# Patient Record
Sex: FEMALE | Race: WHITE | NOT HISPANIC OR LATINO
[De-identification: names, ages, dates, MRNs, and addresses within clinical notes are randomized per-mention and may not be internally consistent; named-entity substitution may affect disease eponyms.]

---

## 2017-04-06 PROBLEM — Z00.00 ENCOUNTER FOR PREVENTIVE HEALTH EXAMINATION: Status: ACTIVE | Noted: 2017-04-06

## 2017-05-04 ENCOUNTER — APPOINTMENT (OUTPATIENT)
Dept: PULMONOLOGY | Facility: CLINIC | Age: 62
End: 2017-05-04

## 2017-05-04 VITALS
TEMPERATURE: 99.3 F | WEIGHT: 185 LBS | SYSTOLIC BLOOD PRESSURE: 130 MMHG | HEIGHT: 65 IN | BODY MASS INDEX: 30.82 KG/M2 | DIASTOLIC BLOOD PRESSURE: 70 MMHG | HEART RATE: 84 BPM | OXYGEN SATURATION: 98 %

## 2017-05-04 DIAGNOSIS — E11.9 TYPE 2 DIABETES MELLITUS W/OUT COMPLICATIONS: ICD-10-CM

## 2017-05-04 DIAGNOSIS — R91.8 OTHER NONSPECIFIC ABNORMAL FINDING OF LUNG FIELD: ICD-10-CM

## 2017-05-04 DIAGNOSIS — K50.10 CROHN'S DISEASE OF LARGE INTESTINE W/OUT COMPLICATIONS: ICD-10-CM

## 2017-05-04 DIAGNOSIS — J45.909 UNSPECIFIED ASTHMA, UNCOMPLICATED: ICD-10-CM

## 2017-05-04 RX ORDER — CANAGLIFLOZIN 300 MG/1
TABLET, FILM COATED ORAL
Refills: 0 | Status: ACTIVE | COMMUNITY

## 2017-05-04 RX ORDER — METOPROLOL TARTRATE 50 MG/1
50 TABLET, FILM COATED ORAL
Refills: 0 | Status: ACTIVE | COMMUNITY

## 2017-05-04 RX ORDER — BUDESONIDE 3 MG/1
3 CAPSULE, COATED PELLETS ORAL
Refills: 0 | Status: ACTIVE | COMMUNITY

## 2017-05-05 PROBLEM — R91.8 MULTIPLE PULMONARY NODULES: Status: ACTIVE | Noted: 2017-05-05

## 2017-06-08 ENCOUNTER — APPOINTMENT (OUTPATIENT)
Dept: PULMONOLOGY | Facility: CLINIC | Age: 62
End: 2017-06-08

## 2017-06-08 VITALS
TEMPERATURE: 98.1 F | HEART RATE: 89 BPM | OXYGEN SATURATION: 98 % | SYSTOLIC BLOOD PRESSURE: 124 MMHG | WEIGHT: 185 LBS | BODY MASS INDEX: 30.82 KG/M2 | HEIGHT: 65 IN | DIASTOLIC BLOOD PRESSURE: 70 MMHG

## 2017-06-08 RX ORDER — PREDNISONE 10 MG/1
10 TABLET ORAL
Qty: 30 | Refills: 1 | Status: ACTIVE | COMMUNITY
Start: 2017-06-08 | End: 1900-01-01

## 2017-06-19 ENCOUNTER — APPOINTMENT (OUTPATIENT)
Dept: PULMONOLOGY | Facility: CLINIC | Age: 62
End: 2017-06-19

## 2017-06-19 VITALS
HEIGHT: 65 IN | TEMPERATURE: 97.9 F | BODY MASS INDEX: 30.82 KG/M2 | SYSTOLIC BLOOD PRESSURE: 132 MMHG | DIASTOLIC BLOOD PRESSURE: 72 MMHG | HEART RATE: 77 BPM | OXYGEN SATURATION: 98 % | WEIGHT: 185 LBS

## 2017-07-20 ENCOUNTER — APPOINTMENT (OUTPATIENT)
Dept: PULMONOLOGY | Facility: CLINIC | Age: 62
End: 2017-07-20

## 2017-08-14 ENCOUNTER — APPOINTMENT (OUTPATIENT)
Dept: PULMONOLOGY | Facility: CLINIC | Age: 62
End: 2017-08-14
Payer: COMMERCIAL

## 2017-08-14 VITALS
HEART RATE: 85 BPM | OXYGEN SATURATION: 96 % | HEIGHT: 65 IN | BODY MASS INDEX: 30.82 KG/M2 | WEIGHT: 185 LBS | DIASTOLIC BLOOD PRESSURE: 72 MMHG | TEMPERATURE: 98.7 F | SYSTOLIC BLOOD PRESSURE: 124 MMHG

## 2017-08-14 PROCEDURE — 99214 OFFICE O/P EST MOD 30 MIN: CPT | Mod: 25

## 2017-08-14 PROCEDURE — 94010 BREATHING CAPACITY TEST: CPT

## 2017-08-15 ENCOUNTER — LABORATORY RESULT (OUTPATIENT)
Age: 62
End: 2017-08-15

## 2017-08-28 LAB
A ALTERNATA IGE QN: <0.1 KUA/L
A FUMIGATUS IGE QN: <0.1 KUA/L
C ALBICANS IGE QN: <0.1 KUA/L
C HERBARUM IGE QN: <0.1 KUA/L
CAT DANDER IGE QN: 0.67 KUA/L
COMMON RAGWEED IGE QN: <0.1 KUA/L
D FARINAE IGE QN: <0.1 KUA/L
D PTERONYSS IGE QN: <0.1 KUA/L
DEPRECATED A ALTERNATA IGE RAST QL: 0
DEPRECATED A FUMIGATUS IGE RAST QL: 0
DEPRECATED C ALBICANS IGE RAST QL: 0
DEPRECATED C HERBARUM IGE RAST QL: 0
DEPRECATED CAT DANDER IGE RAST QL: ABNORMAL
DEPRECATED COMMON RAGWEED IGE RAST QL: 0
DEPRECATED D FARINAE IGE RAST QL: 0
DEPRECATED D PTERONYSS IGE RAST QL: 0
DEPRECATED DOG DANDER IGE RAST QL: ABNORMAL
DEPRECATED M RACEMOSUS IGE RAST QL: 0
DEPRECATED ROACH IGE RAST QL: 0
DEPRECATED TIMOTHY IGE RAST QL: 0
DEPRECATED WHITE OAK IGE RAST QL: ABNORMAL
DOG DANDER IGE QN: 0.65 KUA/L
EOSINOPHIL # BLD MANUAL: 500 /UL
M RACEMOSUS IGE QN: <0.1 KUA/L
ROACH IGE QN: <0.1 KUA/L
TIMOTHY IGE QN: <0.1 KUA/L
TOTAL IGE SMQN RAST: 31 KU/L
WHITE OAK IGE QN: 0.49 KUA/L

## 2017-09-06 RX ORDER — RESLIZUMAB 10 MG/ML
250 INJECTION, SOLUTION, CONCENTRATE INTRAVENOUS ONCE
Qty: 0 | Refills: 0 | Status: DISCONTINUED | OUTPATIENT
Start: 2017-09-07 | End: 2017-09-22

## 2017-09-07 ENCOUNTER — OUTPATIENT (OUTPATIENT)
Dept: OUTPATIENT SERVICES | Facility: HOSPITAL | Age: 62
LOS: 1 days | End: 2017-09-07
Payer: COMMERCIAL

## 2017-09-07 DIAGNOSIS — J45.909 UNSPECIFIED ASTHMA, UNCOMPLICATED: ICD-10-CM

## 2017-09-07 PROCEDURE — 96413 CHEMO IV INFUSION 1 HR: CPT

## 2017-10-04 RX ORDER — RESLIZUMAB 10 MG/ML
250 INJECTION, SOLUTION, CONCENTRATE INTRAVENOUS ONCE
Qty: 0 | Refills: 0 | Status: DISCONTINUED | OUTPATIENT
Start: 2017-10-05 | End: 2017-10-20

## 2017-10-05 ENCOUNTER — OUTPATIENT (OUTPATIENT)
Dept: OUTPATIENT SERVICES | Facility: HOSPITAL | Age: 62
LOS: 1 days | End: 2017-10-05
Payer: COMMERCIAL

## 2017-10-05 DIAGNOSIS — J45.909 UNSPECIFIED ASTHMA, UNCOMPLICATED: ICD-10-CM

## 2017-10-05 PROCEDURE — 96413 CHEMO IV INFUSION 1 HR: CPT

## 2017-11-01 RX ORDER — RESLIZUMAB 10 MG/ML
250 INJECTION, SOLUTION, CONCENTRATE INTRAVENOUS ONCE
Qty: 0 | Refills: 0 | Status: DISCONTINUED | OUTPATIENT
Start: 2017-11-02 | End: 2017-11-17

## 2017-11-02 ENCOUNTER — APPOINTMENT (OUTPATIENT)
Dept: INFUSION THERAPY | Facility: HOSPITAL | Age: 62
End: 2017-11-02

## 2017-11-02 ENCOUNTER — OUTPATIENT (OUTPATIENT)
Dept: OUTPATIENT SERVICES | Facility: HOSPITAL | Age: 62
LOS: 1 days | End: 2017-11-02
Payer: COMMERCIAL

## 2017-11-02 DIAGNOSIS — J45.909 UNSPECIFIED ASTHMA, UNCOMPLICATED: ICD-10-CM

## 2017-11-02 PROCEDURE — 96413 CHEMO IV INFUSION 1 HR: CPT

## 2017-11-29 ENCOUNTER — APPOINTMENT (OUTPATIENT)
Dept: PULMONOLOGY | Facility: CLINIC | Age: 62
End: 2017-11-29
Payer: COMMERCIAL

## 2017-11-29 VITALS
DIASTOLIC BLOOD PRESSURE: 80 MMHG | OXYGEN SATURATION: 98 % | TEMPERATURE: 98.1 F | WEIGHT: 185 LBS | HEIGHT: 65 IN | SYSTOLIC BLOOD PRESSURE: 120 MMHG | HEART RATE: 80 BPM | BODY MASS INDEX: 30.82 KG/M2

## 2017-11-29 PROCEDURE — 94010 BREATHING CAPACITY TEST: CPT

## 2017-11-29 PROCEDURE — 99215 OFFICE O/P EST HI 40 MIN: CPT | Mod: 25

## 2017-11-29 RX ORDER — RESLIZUMAB 10 MG/ML
250 INJECTION, SOLUTION, CONCENTRATE INTRAVENOUS ONCE
Qty: 0 | Refills: 0 | Status: DISCONTINUED | OUTPATIENT
Start: 2017-11-30 | End: 2017-12-15

## 2017-11-30 ENCOUNTER — OUTPATIENT (OUTPATIENT)
Dept: OUTPATIENT SERVICES | Facility: HOSPITAL | Age: 62
LOS: 1 days | End: 2017-11-30
Payer: COMMERCIAL

## 2017-11-30 ENCOUNTER — APPOINTMENT (OUTPATIENT)
Dept: INFUSION THERAPY | Facility: HOSPITAL | Age: 62
End: 2017-11-30

## 2017-11-30 DIAGNOSIS — J45.909 UNSPECIFIED ASTHMA, UNCOMPLICATED: ICD-10-CM

## 2017-11-30 PROCEDURE — 96413 CHEMO IV INFUSION 1 HR: CPT

## 2017-12-05 DIAGNOSIS — J45.50 SEVERE PERSISTENT ASTHMA, UNCOMPLICATED: ICD-10-CM

## 2017-12-27 RX ORDER — RESLIZUMAB 10 MG/ML
250 INJECTION, SOLUTION, CONCENTRATE INTRAVENOUS ONCE
Qty: 0 | Refills: 0 | Status: DISCONTINUED | OUTPATIENT
Start: 2018-01-23 | End: 2018-02-07

## 2018-01-04 ENCOUNTER — APPOINTMENT (OUTPATIENT)
Dept: INFUSION THERAPY | Facility: HOSPITAL | Age: 63
End: 2018-01-04

## 2018-01-23 ENCOUNTER — APPOINTMENT (OUTPATIENT)
Dept: INFUSION THERAPY | Facility: HOSPITAL | Age: 63
End: 2018-01-23

## 2018-01-23 ENCOUNTER — OUTPATIENT (OUTPATIENT)
Dept: OUTPATIENT SERVICES | Facility: HOSPITAL | Age: 63
LOS: 1 days | End: 2018-01-23
Payer: COMMERCIAL

## 2018-01-23 DIAGNOSIS — J45.909 UNSPECIFIED ASTHMA, UNCOMPLICATED: ICD-10-CM

## 2018-01-23 PROCEDURE — 96413 CHEMO IV INFUSION 1 HR: CPT

## 2018-02-26 ENCOUNTER — APPOINTMENT (OUTPATIENT)
Dept: PULMONOLOGY | Facility: CLINIC | Age: 63
End: 2018-02-26

## 2018-03-22 ENCOUNTER — APPOINTMENT (OUTPATIENT)
Dept: INFUSION THERAPY | Facility: HOSPITAL | Age: 63
End: 2018-03-22

## 2018-06-29 ENCOUNTER — APPOINTMENT (OUTPATIENT)
Dept: PULMONOLOGY | Facility: CLINIC | Age: 63
End: 2018-06-29
Payer: COMMERCIAL

## 2018-06-29 VITALS
DIASTOLIC BLOOD PRESSURE: 80 MMHG | BODY MASS INDEX: 30.82 KG/M2 | TEMPERATURE: 98.8 F | SYSTOLIC BLOOD PRESSURE: 148 MMHG | HEART RATE: 104 BPM | WEIGHT: 185 LBS | OXYGEN SATURATION: 93 % | HEIGHT: 65 IN

## 2018-06-29 PROCEDURE — 94010 BREATHING CAPACITY TEST: CPT

## 2018-06-29 PROCEDURE — 99214 OFFICE O/P EST MOD 30 MIN: CPT | Mod: 25

## 2018-08-03 ENCOUNTER — OUTPATIENT (OUTPATIENT)
Dept: OUTPATIENT SERVICES | Facility: HOSPITAL | Age: 63
LOS: 1 days | End: 2018-08-03
Payer: COMMERCIAL

## 2018-08-03 ENCOUNTER — APPOINTMENT (OUTPATIENT)
Dept: INFUSION THERAPY | Facility: HOSPITAL | Age: 63
End: 2018-08-03

## 2018-08-03 VITALS
SYSTOLIC BLOOD PRESSURE: 128 MMHG | TEMPERATURE: 97 F | RESPIRATION RATE: 18 BRPM | DIASTOLIC BLOOD PRESSURE: 72 MMHG | WEIGHT: 190.04 LBS | OXYGEN SATURATION: 98 % | HEIGHT: 66 IN | HEART RATE: 82 BPM

## 2018-08-03 DIAGNOSIS — J45.909 UNSPECIFIED ASTHMA, UNCOMPLICATED: ICD-10-CM

## 2018-08-03 PROCEDURE — 96372 THER/PROPH/DIAG INJ SC/IM: CPT

## 2018-08-03 RX ORDER — RESLIZUMAB 10 MG/ML
250 INJECTION, SOLUTION, CONCENTRATE INTRAVENOUS ONCE
Qty: 0 | Refills: 0 | Status: COMPLETED | OUTPATIENT
Start: 2018-08-03 | End: 2018-08-03

## 2018-08-03 RX ADMIN — RESLIZUMAB 90 MILLIGRAM(S): 10 INJECTION, SOLUTION, CONCENTRATE INTRAVENOUS at 10:09

## 2018-08-31 ENCOUNTER — OUTPATIENT (OUTPATIENT)
Dept: OUTPATIENT SERVICES | Facility: HOSPITAL | Age: 63
LOS: 1 days | End: 2018-08-31
Payer: COMMERCIAL

## 2018-08-31 ENCOUNTER — APPOINTMENT (OUTPATIENT)
Dept: INFUSION THERAPY | Facility: HOSPITAL | Age: 63
End: 2018-08-31

## 2018-08-31 VITALS
SYSTOLIC BLOOD PRESSURE: 124 MMHG | RESPIRATION RATE: 18 BRPM | OXYGEN SATURATION: 99 % | HEIGHT: 66 IN | HEART RATE: 71 BPM | WEIGHT: 190.04 LBS | DIASTOLIC BLOOD PRESSURE: 71 MMHG | TEMPERATURE: 99 F

## 2018-08-31 DIAGNOSIS — J45.50 SEVERE PERSISTENT ASTHMA, UNCOMPLICATED: ICD-10-CM

## 2018-08-31 PROCEDURE — 96413 CHEMO IV INFUSION 1 HR: CPT

## 2018-08-31 RX ORDER — RESLIZUMAB 10 MG/ML
250 INJECTION, SOLUTION, CONCENTRATE INTRAVENOUS ONCE
Qty: 0 | Refills: 0 | Status: COMPLETED | OUTPATIENT
Start: 2018-08-31 | End: 2018-08-31

## 2018-08-31 RX ADMIN — RESLIZUMAB 90 MILLIGRAM(S): 10 INJECTION, SOLUTION, CONCENTRATE INTRAVENOUS at 13:02

## 2018-09-28 ENCOUNTER — APPOINTMENT (OUTPATIENT)
Dept: INFUSION THERAPY | Facility: HOSPITAL | Age: 63
End: 2018-09-28

## 2018-09-28 ENCOUNTER — OUTPATIENT (OUTPATIENT)
Dept: OUTPATIENT SERVICES | Facility: HOSPITAL | Age: 63
LOS: 1 days | End: 2018-09-28
Payer: COMMERCIAL

## 2018-09-28 VITALS
RESPIRATION RATE: 18 BRPM | SYSTOLIC BLOOD PRESSURE: 119 MMHG | DIASTOLIC BLOOD PRESSURE: 65 MMHG | OXYGEN SATURATION: 97 % | HEART RATE: 76 BPM | TEMPERATURE: 99 F

## 2018-09-28 VITALS
HEART RATE: 66 BPM | DIASTOLIC BLOOD PRESSURE: 66 MMHG | TEMPERATURE: 98 F | SYSTOLIC BLOOD PRESSURE: 121 MMHG | RESPIRATION RATE: 18 BRPM | OXYGEN SATURATION: 98 %

## 2018-09-28 DIAGNOSIS — J45.50 SEVERE PERSISTENT ASTHMA, UNCOMPLICATED: ICD-10-CM

## 2018-09-28 PROCEDURE — 96413 CHEMO IV INFUSION 1 HR: CPT

## 2018-09-28 RX ORDER — RESLIZUMAB 10 MG/ML
252 INJECTION, SOLUTION, CONCENTRATE INTRAVENOUS ONCE
Qty: 0 | Refills: 0 | Status: COMPLETED | OUTPATIENT
Start: 2018-09-28 | End: 2018-09-28

## 2018-09-28 RX ADMIN — RESLIZUMAB 90.24 MILLIGRAM(S): 10 INJECTION, SOLUTION, CONCENTRATE INTRAVENOUS at 10:29

## 2018-11-02 ENCOUNTER — APPOINTMENT (OUTPATIENT)
Dept: INFUSION THERAPY | Facility: HOSPITAL | Age: 63
End: 2018-11-02

## 2018-11-02 ENCOUNTER — OUTPATIENT (OUTPATIENT)
Dept: OUTPATIENT SERVICES | Facility: HOSPITAL | Age: 63
LOS: 1 days | End: 2018-11-02
Payer: COMMERCIAL

## 2018-11-02 VITALS
TEMPERATURE: 98 F | SYSTOLIC BLOOD PRESSURE: 114 MMHG | RESPIRATION RATE: 18 BRPM | DIASTOLIC BLOOD PRESSURE: 72 MMHG | WEIGHT: 184.97 LBS | OXYGEN SATURATION: 98 % | HEART RATE: 82 BPM | HEIGHT: 66 IN

## 2018-11-02 DIAGNOSIS — J45.50 SEVERE PERSISTENT ASTHMA, UNCOMPLICATED: ICD-10-CM

## 2018-11-02 PROCEDURE — 96413 CHEMO IV INFUSION 1 HR: CPT

## 2018-11-02 RX ORDER — RESLIZUMAB 10 MG/ML
252 INJECTION, SOLUTION, CONCENTRATE INTRAVENOUS ONCE
Qty: 0 | Refills: 0 | Status: COMPLETED | OUTPATIENT
Start: 2018-11-02 | End: 2018-11-02

## 2018-11-02 RX ADMIN — RESLIZUMAB 90.24 MILLIGRAM(S): 10 INJECTION, SOLUTION, CONCENTRATE INTRAVENOUS at 12:42

## 2018-11-30 ENCOUNTER — APPOINTMENT (OUTPATIENT)
Dept: INFUSION THERAPY | Facility: HOSPITAL | Age: 63
End: 2018-11-30

## 2019-03-07 ENCOUNTER — OUTPATIENT (OUTPATIENT)
Dept: OUTPATIENT SERVICES | Facility: HOSPITAL | Age: 64
LOS: 1 days | End: 2019-03-07
Payer: COMMERCIAL

## 2019-03-07 ENCOUNTER — APPOINTMENT (OUTPATIENT)
Dept: INFUSION THERAPY | Facility: HOSPITAL | Age: 64
End: 2019-03-07

## 2019-03-07 VITALS
HEIGHT: 66 IN | WEIGHT: 179.9 LBS | RESPIRATION RATE: 18 BRPM | TEMPERATURE: 99 F | DIASTOLIC BLOOD PRESSURE: 68 MMHG | HEART RATE: 76 BPM | SYSTOLIC BLOOD PRESSURE: 118 MMHG | OXYGEN SATURATION: 99 %

## 2019-03-07 DIAGNOSIS — J45.50 SEVERE PERSISTENT ASTHMA, UNCOMPLICATED: ICD-10-CM

## 2019-03-07 PROCEDURE — 96413 CHEMO IV INFUSION 1 HR: CPT

## 2019-03-07 RX ORDER — RESLIZUMAB 10 MG/ML
252 INJECTION, SOLUTION, CONCENTRATE INTRAVENOUS ONCE
Qty: 0 | Refills: 0 | Status: COMPLETED | OUTPATIENT
Start: 2019-03-07 | End: 2019-03-07

## 2019-03-07 RX ADMIN — RESLIZUMAB 90.24 MILLIGRAM(S): 10 INJECTION, SOLUTION, CONCENTRATE INTRAVENOUS at 11:03

## 2019-03-07 RX ADMIN — RESLIZUMAB 252 MILLIGRAM(S): 10 INJECTION, SOLUTION, CONCENTRATE INTRAVENOUS at 12:00

## 2019-04-04 ENCOUNTER — OUTPATIENT (OUTPATIENT)
Dept: OUTPATIENT SERVICES | Facility: HOSPITAL | Age: 64
LOS: 1 days | End: 2019-04-04
Payer: COMMERCIAL

## 2019-04-04 ENCOUNTER — APPOINTMENT (OUTPATIENT)
Dept: INFUSION THERAPY | Facility: HOSPITAL | Age: 64
End: 2019-04-04

## 2019-04-04 VITALS
RESPIRATION RATE: 16 BRPM | DIASTOLIC BLOOD PRESSURE: 65 MMHG | TEMPERATURE: 98 F | HEIGHT: 65 IN | HEART RATE: 82 BPM | WEIGHT: 184.97 LBS | OXYGEN SATURATION: 98 % | SYSTOLIC BLOOD PRESSURE: 115 MMHG

## 2019-04-04 DIAGNOSIS — J45.50 SEVERE PERSISTENT ASTHMA, UNCOMPLICATED: ICD-10-CM

## 2019-04-04 PROCEDURE — 96413 CHEMO IV INFUSION 1 HR: CPT

## 2019-04-04 RX ORDER — RESLIZUMAB 10 MG/ML
252 INJECTION, SOLUTION, CONCENTRATE INTRAVENOUS ONCE
Qty: 0 | Refills: 0 | Status: COMPLETED | OUTPATIENT
Start: 2019-04-04 | End: 2019-04-04

## 2019-04-04 RX ADMIN — RESLIZUMAB 252 MILLIGRAM(S): 10 INJECTION, SOLUTION, CONCENTRATE INTRAVENOUS at 11:45

## 2019-04-04 RX ADMIN — RESLIZUMAB 90.24 MILLIGRAM(S): 10 INJECTION, SOLUTION, CONCENTRATE INTRAVENOUS at 11:12

## 2019-05-03 ENCOUNTER — OUTPATIENT (OUTPATIENT)
Dept: OUTPATIENT SERVICES | Facility: HOSPITAL | Age: 64
LOS: 1 days | End: 2019-05-03
Payer: COMMERCIAL

## 2019-05-03 ENCOUNTER — APPOINTMENT (OUTPATIENT)
Dept: INFUSION THERAPY | Facility: HOSPITAL | Age: 64
End: 2019-05-03

## 2019-05-03 VITALS
HEIGHT: 66 IN | DIASTOLIC BLOOD PRESSURE: 68 MMHG | WEIGHT: 184.97 LBS | HEART RATE: 75 BPM | TEMPERATURE: 98 F | RESPIRATION RATE: 16 BRPM | SYSTOLIC BLOOD PRESSURE: 116 MMHG | OXYGEN SATURATION: 99 %

## 2019-05-03 DIAGNOSIS — J45.50 SEVERE PERSISTENT ASTHMA, UNCOMPLICATED: ICD-10-CM

## 2019-05-03 PROCEDURE — 96413 CHEMO IV INFUSION 1 HR: CPT

## 2019-05-03 RX ORDER — RESLIZUMAB 10 MG/ML
252 INJECTION, SOLUTION, CONCENTRATE INTRAVENOUS ONCE
Qty: 0 | Refills: 0 | Status: COMPLETED | OUTPATIENT
Start: 2019-05-03 | End: 2019-05-03

## 2019-05-03 RX ADMIN — RESLIZUMAB 90.24 MILLIGRAM(S): 10 INJECTION, SOLUTION, CONCENTRATE INTRAVENOUS at 13:34

## 2019-05-03 RX ADMIN — RESLIZUMAB 252 MILLIGRAM(S): 10 INJECTION, SOLUTION, CONCENTRATE INTRAVENOUS at 14:24

## 2019-05-14 ENCOUNTER — APPOINTMENT (OUTPATIENT)
Dept: PULMONOLOGY | Facility: CLINIC | Age: 64
End: 2019-05-14
Payer: COMMERCIAL

## 2019-05-14 VITALS
BODY MASS INDEX: 31.65 KG/M2 | SYSTOLIC BLOOD PRESSURE: 120 MMHG | HEIGHT: 65 IN | WEIGHT: 190 LBS | HEART RATE: 86 BPM | OXYGEN SATURATION: 98 % | TEMPERATURE: 98.5 F | DIASTOLIC BLOOD PRESSURE: 80 MMHG

## 2019-05-14 DIAGNOSIS — Z79.899 OTHER LONG TERM (CURRENT) DRUG THERAPY: ICD-10-CM

## 2019-05-14 PROCEDURE — 99214 OFFICE O/P EST MOD 30 MIN: CPT | Mod: 25

## 2019-05-14 PROCEDURE — 95012 NITRIC OXIDE EXP GAS DETER: CPT

## 2019-05-14 PROCEDURE — 94010 BREATHING CAPACITY TEST: CPT

## 2019-05-14 RX ORDER — BUDESONIDE AND FORMOTEROL FUMARATE DIHYDRATE 160; 4.5 UG/1; UG/1
160-4.5 AEROSOL RESPIRATORY (INHALATION)
Qty: 1 | Refills: 11 | Status: ACTIVE | COMMUNITY
Start: 2019-05-14 | End: 1900-01-01

## 2019-05-14 RX ORDER — MESALAMINE 500 MG
500 SUPPOSITORY, RECTAL RECTAL
Refills: 0 | Status: ACTIVE | COMMUNITY

## 2019-05-14 NOTE — PHYSICAL EXAM
[General Appearance - Well Developed] : well developed [Normal Appearance] : normal appearance [Well Groomed] : well groomed [General Appearance - Well Nourished] : well nourished [No Deformities] : no deformities [General Appearance - In No Acute Distress] : no acute distress [Normal Conjunctiva] : the conjunctiva exhibited no abnormalities [Eyelids - No Xanthelasma] : the eyelids demonstrated no xanthelasmas [Normal Oropharynx] : normal oropharynx [Neck Cervical Mass (___cm)] : no neck mass was observed [Neck Appearance] : the appearance of the neck was normal [Jugular Venous Distention Increased] : there was no jugular-venous distention [Thyroid Diffuse Enlargement] : the thyroid was not enlarged [Thyroid Nodule] : there were no palpable thyroid nodules [Respiration, Rhythm And Depth] : normal respiratory rhythm and effort [FreeTextEntry1] : clear  lungs despite significnat flow obstruction [Exaggerated Use Of Accessory Muscles For Inspiration] : no accessory muscle use [Auscultation Breath Sounds / Voice Sounds] : lungs were clear to auscultation bilaterally [Abnormal Walk] : normal gait [Gait - Sufficient For Exercise Testing] : the gait was sufficient for exercise testing [Nail Clubbing] : no clubbing of the fingernails [Cyanosis, Localized] : no localized cyanosis [Petechial Hemorrhages (___cm)] : no petechial hemorrhages [] : no ischemic changes [Deep Tendon Reflexes (DTR)] : deep tendon reflexes were 2+ and symmetric [Sensation] : the sensory exam was normal to light touch and pinprick [No Focal Deficits] : no focal deficits

## 2019-05-14 NOTE — PROCEDURE
[FreeTextEntry1] : spirometry with siginifiant obstruction with fev1 of 1.2 liters, around 100cc below

## 2019-05-14 NOTE — HISTORY OF PRESENT ILLNESS
[FreeTextEntry1] : back on cinqair after having gone off because ot foot surgery, and now feeling better, not using dual contoller because we miscomuniated, and does feel some cogestion in her chest\par   also on humira for crohns  and on meds for diabetes.\par \par but looks well and feels relatively well.

## 2019-05-14 NOTE — REASON FOR VISIT
[Follow-Up] : a follow-up visit [FreeTextEntry1] : young woman with severe asthma, had delay with cinquair, because of foot surgery, now back on it, but not using dual controller.

## 2019-05-14 NOTE — REVIEW OF SYSTEMS
[Eye Irritation] : ~T irritation of the eyes [Nasal Congestion] : nasal congestion [Dyspnea] : dyspnea [Hay Fever] : hay fever [Negative] : Gastrointestinal [FreeTextEntry8] : spiroemtry is back to it's baseline.

## 2019-05-14 NOTE — DISCUSSION/SUMMARY
[FreeTextEntry1] : plan is to put her back on symicort and have her return in a month\par \par continue with the cinqair for which has been great for her.\par \par multiple other maladies, such as crohns and diabetes discussedin detail

## 2019-05-31 ENCOUNTER — APPOINTMENT (OUTPATIENT)
Dept: INFUSION THERAPY | Facility: HOSPITAL | Age: 64
End: 2019-05-31

## 2019-05-31 ENCOUNTER — OUTPATIENT (OUTPATIENT)
Dept: OUTPATIENT SERVICES | Facility: HOSPITAL | Age: 64
LOS: 1 days | End: 2019-05-31
Payer: COMMERCIAL

## 2019-05-31 VITALS
SYSTOLIC BLOOD PRESSURE: 110 MMHG | DIASTOLIC BLOOD PRESSURE: 70 MMHG | RESPIRATION RATE: 18 BRPM | HEART RATE: 89 BPM | TEMPERATURE: 99 F | OXYGEN SATURATION: 97 %

## 2019-05-31 DIAGNOSIS — J45.50 SEVERE PERSISTENT ASTHMA, UNCOMPLICATED: ICD-10-CM

## 2019-05-31 PROCEDURE — 96413 CHEMO IV INFUSION 1 HR: CPT

## 2019-05-31 RX ORDER — RESLIZUMAB 10 MG/ML
252 INJECTION, SOLUTION, CONCENTRATE INTRAVENOUS ONCE
Refills: 0 | Status: COMPLETED | OUTPATIENT
Start: 2019-05-31 | End: 2019-05-31

## 2019-05-31 RX ADMIN — RESLIZUMAB 252 MILLIGRAM(S): 10 INJECTION, SOLUTION, CONCENTRATE INTRAVENOUS at 11:50

## 2019-05-31 RX ADMIN — RESLIZUMAB 90.24 MILLIGRAM(S): 10 INJECTION, SOLUTION, CONCENTRATE INTRAVENOUS at 11:00

## 2019-06-28 ENCOUNTER — APPOINTMENT (OUTPATIENT)
Dept: INFUSION THERAPY | Facility: HOSPITAL | Age: 64
End: 2019-06-28

## 2019-07-01 ENCOUNTER — APPOINTMENT (OUTPATIENT)
Dept: PULMONOLOGY | Facility: CLINIC | Age: 64
End: 2019-07-01

## 2019-07-26 ENCOUNTER — APPOINTMENT (OUTPATIENT)
Dept: INFUSION THERAPY | Facility: HOSPITAL | Age: 64
End: 2019-07-26

## 2019-07-26 ENCOUNTER — OUTPATIENT (OUTPATIENT)
Dept: OUTPATIENT SERVICES | Facility: HOSPITAL | Age: 64
LOS: 1 days | End: 2019-07-26
Payer: COMMERCIAL

## 2019-07-26 VITALS
TEMPERATURE: 98 F | SYSTOLIC BLOOD PRESSURE: 113 MMHG | DIASTOLIC BLOOD PRESSURE: 72 MMHG | OXYGEN SATURATION: 98 % | HEART RATE: 83 BPM | RESPIRATION RATE: 16 BRPM

## 2019-07-26 DIAGNOSIS — J45.50 SEVERE PERSISTENT ASTHMA, UNCOMPLICATED: ICD-10-CM

## 2019-07-26 PROCEDURE — 96413 CHEMO IV INFUSION 1 HR: CPT

## 2019-07-26 RX ORDER — RESLIZUMAB 10 MG/ML
252 INJECTION, SOLUTION, CONCENTRATE INTRAVENOUS ONCE
Refills: 0 | Status: COMPLETED | OUTPATIENT
Start: 2019-07-26 | End: 2019-07-26

## 2019-07-26 RX ADMIN — RESLIZUMAB 252 MILLIGRAM(S): 10 INJECTION, SOLUTION, CONCENTRATE INTRAVENOUS at 11:40

## 2019-07-26 RX ADMIN — RESLIZUMAB 90.24 MILLIGRAM(S): 10 INJECTION, SOLUTION, CONCENTRATE INTRAVENOUS at 10:50

## 2019-08-23 ENCOUNTER — APPOINTMENT (OUTPATIENT)
Age: 64
End: 2019-08-23

## 2019-08-23 ENCOUNTER — OUTPATIENT (OUTPATIENT)
Dept: OUTPATIENT SERVICES | Facility: HOSPITAL | Age: 64
LOS: 1 days | End: 2019-08-23
Payer: COMMERCIAL

## 2019-08-23 VITALS
TEMPERATURE: 99 F | RESPIRATION RATE: 18 BRPM | SYSTOLIC BLOOD PRESSURE: 139 MMHG | WEIGHT: 195.11 LBS | OXYGEN SATURATION: 98 % | HEART RATE: 75 BPM | HEIGHT: 66 IN | DIASTOLIC BLOOD PRESSURE: 77 MMHG

## 2019-08-23 DIAGNOSIS — J45.50 SEVERE PERSISTENT ASTHMA, UNCOMPLICATED: ICD-10-CM

## 2019-08-23 PROCEDURE — 96413 CHEMO IV INFUSION 1 HR: CPT

## 2019-08-23 RX ORDER — RESLIZUMAB 10 MG/ML
252 INJECTION, SOLUTION, CONCENTRATE INTRAVENOUS ONCE
Refills: 0 | Status: COMPLETED | OUTPATIENT
Start: 2019-08-23 | End: 2019-08-23

## 2019-08-23 RX ADMIN — RESLIZUMAB 90.24 MILLIGRAM(S): 10 INJECTION, SOLUTION, CONCENTRATE INTRAVENOUS at 11:29

## 2019-08-23 RX ADMIN — RESLIZUMAB 252 MILLIGRAM(S): 10 INJECTION, SOLUTION, CONCENTRATE INTRAVENOUS at 12:20

## 2019-09-20 ENCOUNTER — APPOINTMENT (OUTPATIENT)
Age: 64
End: 2019-09-20

## 2019-09-20 ENCOUNTER — OUTPATIENT (OUTPATIENT)
Dept: OUTPATIENT SERVICES | Facility: HOSPITAL | Age: 64
LOS: 1 days | End: 2019-09-20
Payer: COMMERCIAL

## 2019-09-20 DIAGNOSIS — J45.50 SEVERE PERSISTENT ASTHMA, UNCOMPLICATED: ICD-10-CM

## 2019-09-20 PROCEDURE — 96413 CHEMO IV INFUSION 1 HR: CPT

## 2019-09-20 RX ORDER — RESLIZUMAB 10 MG/ML
259 INJECTION, SOLUTION, CONCENTRATE INTRAVENOUS ONCE
Refills: 0 | Status: COMPLETED | OUTPATIENT
Start: 2019-09-20 | End: 2019-09-20

## 2019-09-20 RX ADMIN — RESLIZUMAB 259 MILLIGRAM(S): 10 INJECTION, SOLUTION, CONCENTRATE INTRAVENOUS at 12:00

## 2019-09-20 RX ADMIN — RESLIZUMAB 91.08 MILLIGRAM(S): 10 INJECTION, SOLUTION, CONCENTRATE INTRAVENOUS at 11:10

## 2019-10-14 ENCOUNTER — APPOINTMENT (OUTPATIENT)
Dept: PULMONOLOGY | Facility: CLINIC | Age: 64
End: 2019-10-14
Payer: COMMERCIAL

## 2019-10-14 VITALS
SYSTOLIC BLOOD PRESSURE: 110 MMHG | HEART RATE: 90 BPM | BODY MASS INDEX: 31.65 KG/M2 | WEIGHT: 190 LBS | OXYGEN SATURATION: 94 % | HEIGHT: 65 IN | DIASTOLIC BLOOD PRESSURE: 70 MMHG

## 2019-10-14 PROCEDURE — 95012 NITRIC OXIDE EXP GAS DETER: CPT

## 2019-10-14 PROCEDURE — 94010 BREATHING CAPACITY TEST: CPT

## 2019-10-14 PROCEDURE — 99214 OFFICE O/P EST MOD 30 MIN: CPT | Mod: 25

## 2019-10-18 ENCOUNTER — APPOINTMENT (OUTPATIENT)
Age: 64
End: 2019-10-18

## 2019-10-18 ENCOUNTER — OUTPATIENT (OUTPATIENT)
Dept: OUTPATIENT SERVICES | Facility: HOSPITAL | Age: 64
LOS: 1 days | End: 2019-10-18
Payer: COMMERCIAL

## 2019-10-18 VITALS
SYSTOLIC BLOOD PRESSURE: 134 MMHG | TEMPERATURE: 98 F | OXYGEN SATURATION: 99 % | RESPIRATION RATE: 18 BRPM | DIASTOLIC BLOOD PRESSURE: 70 MMHG | HEART RATE: 94 BPM

## 2019-10-18 DIAGNOSIS — J45.50 SEVERE PERSISTENT ASTHMA, UNCOMPLICATED: ICD-10-CM

## 2019-10-18 PROCEDURE — 96413 CHEMO IV INFUSION 1 HR: CPT

## 2019-10-18 RX ORDER — DULAGLUTIDE 4.5 MG/.5ML
0 INJECTION, SOLUTION SUBCUTANEOUS
Qty: 0 | Refills: 0 | DISCHARGE

## 2019-10-18 RX ORDER — RESLIZUMAB 10 MG/ML
259 INJECTION, SOLUTION, CONCENTRATE INTRAVENOUS ONCE
Refills: 0 | Status: COMPLETED | OUTPATIENT
Start: 2019-10-18 | End: 2019-10-18

## 2019-10-18 RX ORDER — BUDESONIDE, MICRONIZED 100 %
0 POWDER (GRAM) MISCELLANEOUS
Qty: 0 | Refills: 0 | DISCHARGE

## 2019-10-18 RX ORDER — METOPROLOL TARTRATE 50 MG
1 TABLET ORAL
Qty: 0 | Refills: 0 | DISCHARGE

## 2019-10-18 RX ORDER — BUDESONIDE AND FORMOTEROL FUMARATE DIHYDRATE 160; 4.5 UG/1; UG/1
2 AEROSOL RESPIRATORY (INHALATION)
Qty: 0 | Refills: 0 | DISCHARGE

## 2019-10-18 RX ADMIN — RESLIZUMAB 259 MILLIGRAM(S): 10 INJECTION, SOLUTION, CONCENTRATE INTRAVENOUS at 12:05

## 2019-10-18 RX ADMIN — RESLIZUMAB 91.08 MILLIGRAM(S): 10 INJECTION, SOLUTION, CONCENTRATE INTRAVENOUS at 11:15

## 2019-11-15 ENCOUNTER — APPOINTMENT (OUTPATIENT)
Age: 64
End: 2019-11-15

## 2020-02-26 ENCOUNTER — FORM ENCOUNTER (OUTPATIENT)
Age: 65
End: 2020-02-26

## 2020-02-27 ENCOUNTER — APPOINTMENT (OUTPATIENT)
Dept: ORTHOPEDIC SURGERY | Facility: CLINIC | Age: 65
End: 2020-02-27
Payer: MEDICAID

## 2020-02-27 ENCOUNTER — OUTPATIENT (OUTPATIENT)
Dept: OUTPATIENT SERVICES | Facility: HOSPITAL | Age: 65
LOS: 1 days | End: 2020-02-27
Payer: COMMERCIAL

## 2020-02-27 VITALS
HEIGHT: 65 IN | OXYGEN SATURATION: 98 % | WEIGHT: 190 LBS | SYSTOLIC BLOOD PRESSURE: 140 MMHG | HEART RATE: 76 BPM | DIASTOLIC BLOOD PRESSURE: 80 MMHG | BODY MASS INDEX: 31.65 KG/M2

## 2020-02-27 DIAGNOSIS — M17.12 UNILATERAL PRIMARY OSTEOARTHRITIS, LEFT KNEE: ICD-10-CM

## 2020-02-27 PROCEDURE — 73564 X-RAY EXAM KNEE 4 OR MORE: CPT | Mod: 26,50

## 2020-02-27 PROCEDURE — 99204 OFFICE O/P NEW MOD 45 MIN: CPT

## 2020-02-27 PROCEDURE — 73564 X-RAY EXAM KNEE 4 OR MORE: CPT

## 2020-02-27 RX ORDER — TRAMADOL HYDROCHLORIDE 50 MG/1
50 TABLET, COATED ORAL
Qty: 28 | Refills: 0 | Status: ACTIVE | COMMUNITY
Start: 2020-02-27 | End: 1900-01-01

## 2020-02-27 RX ORDER — TRAMADOL HYDROCHLORIDE 50 MG/1
50 TABLET, COATED ORAL
Qty: 28 | Refills: 0 | Status: DISCONTINUED | COMMUNITY
Start: 2020-02-27 | End: 2020-02-27

## 2020-02-27 NOTE — PHYSICAL EXAM
[de-identified] : General: Well-nourished, well-developed, alert, and in no acute distress.\par Head: Normocephalic.\par Eyes: Pupils equal round reactive to light and accommodation, extraocular muscles intact, normal sclera.\par Nose: No nasal discharge.\par Cardiac: Regular rate. Extremities are warm and well perfused. Distal pulses are symmetric bilaterally.\par Respiratory: No labored breathing.\par Extremities: Sensation is intact distally bilaterally.  Distal pulses are symmetric bilaterally\par Neurologic: No focal deficits\par Skin: Normal skin color, texture, and turgor\par Psychiatric: Normal affect\par \par RIGHT KNEE: LIMITED 2/2 GUARDING\par \par Inspection: no bruising, erythema\par Joint Effusion:TRACE\par ROM: Knee Flexion 100 DEGREES WITH EXTREME PAIN, Knee Extension +5 DEGREES\par Palpation:SEVERE MEDIAL JOINT LINE PAIN, PAIN AT MFC AND MEDIAL TIBIAL PLATEAU, PATELLAR FACET PAIN, No pain at patellar tendon, LFC, Lateral Tibial Plateau\par Leg Length Discrepancy:no\par Patella: MILDLY APPREHENSIVE\par Distal Pulses: normal\par Lower Extremity Strength:4/5 KNEE EXTENSION 5-/5 KNEE EXTENSION\par Lower Extremity Reflexes:normal, 2+\par Lower Extremity Sensation: normal\par \par Special Tests:\par Mallory:EQUIVIOCAL\par Layla: POSITIVE MEDIALLY\par Anterior Drawer:Negative\par Posterior Drawer:Negative \par Varus/Valgus: PAIN WITH VALGUS STRESS, NO SIGNIFICANT GAPPING\par \par LEFT KNEE:\par \par Inspection: no bruising, swelling, erythema\par Joint Effusion:no \par ROM: Knee Flexion 130 , Knee Extension 0\par Palpation:MEDIAL JOINT LINE PAIN, MILD PAIN AT MEDIAL TIBIAL PLATEAU, No pain at patellar tendon, MFC/LFC, Lateral Tibial Plateau\par Leg Length Discrepancy:no\par Patella: no apprehension\par Distal Pulses: normal\par Lower Extremity Strength:normal, 5/5 \par Lower Extremity Reflexes:normal, 2+\par Lower Extremity Sensation: normal\par \par Special Tests:\par Mallory:Negative \par Layla: Negative\par Anterior Drawer:Negative\par Posterior Drawer:Negative \par Varus/Valgus:Negative, no instability\par  [de-identified] : Xray Bilateral Knees - Multiple views were reviewed with the patient in detail. \par \par INTERPRETATION: Indication: Knee pain \par \par Four views of each knee demonstrate no fracture or dislocation. Bilateral \par patellofemoral and medial compartment joint space narrowing with osteophyte \par formation is present. There is no effusion. No fracture or dislocation is \par present.

## 2020-02-27 NOTE — DISCUSSION/SUMMARY
[Medication Risks Reviewed] : Medication risks reviewed [de-identified] : The patient is a 64 year old woman with history of Crohn's Disease, DM, presenting with a several month history of severe, atraumatic, bilateral knee pain, right worse than left.  She has radiographic evidence of severe knee arthrosis affecting medial and patellofemoral compartment with erosive changes.  Cannot rule out inflammatory arthropathy.\par \par Imaging was reviewed with the patient in detail.  All questions were answered appropriately.\par \par MRI of bilateral knees ordered today to rule out insufficiency fracture, erosive arthropathy, inflammatory synovitis, AVN.\par \par Patient was prescribed a short course of Tramadol.  Prior to prescribing, history of the patient's scheduled medication use was reviewed utilizing the I:STOP [place]  aware program.  Appropriate use of medication was discussed with the patient in detail.  The risks, benefits, adverse effects, alternative options were discussed.  The patient expressed understanding.\par \par I recommended that she have a consultation with Rheumatology to rule out autoimmune/rheumatologic process.\par \par She was counseled on rest and activity modification.  Continue use of walking cane to offload area.\par \par Follow-up after MRI.  If symptoms consistent with OA, inflammatory arthritis, can consider cortisone vs. HA injections.\par \par Patient appreciates and agrees with current plan.\par \par This note was generated using dragon medical dictation software.  A reasonable effort has been made for proofreading its contents, but typos may still remain.  If there are any questions or points of clarification needed please notify my office.\par \par \par

## 2020-02-27 NOTE — REVIEW OF SYSTEMS
[Negative] : Endocrine [Joint Pain] : joint pain [Feeling Tired] : fatigue [Abdominal Pain] : abdominal pain [Joint Swelling] : joint swelling [Depression] : depression [Diarrhea] : diarrhea [Sleep Disturbances] : ~T sleep disturbances [Muscle Weakness] : muscle weakness [Hot Flashes] : hot flashes

## 2020-02-27 NOTE — HISTORY OF PRESENT ILLNESS
[de-identified] : The patient is a 64 year old woman with history of Crohn's disease, DM presenting with bilateral knee pain, right worse than left.\par \par The patient presents with a several month history of atraumatic, bilateral knee pain.  She has a history of Crohn's disease but has not seen her GI specialist for several months because of change in insurance/providers.  She initially saw a physician, Dr. Morris in August and had Xrays of bilateral knees showing medial compartment narrowing and erosive changes at the patellofemoral joint.  Apparently, she was possibly prescribed a gout medication to help with inflammation, but was discontinued by her PMD.  She is not a good candidate for NSAIDs given Crohn's disease.  Her pain has progressively worsened over the last few months.  She has a history of right ankle surgery, and since that surgery, she has been using a walking cane as an assist device.  She denies significant knee swelling or bruising.  She feels unstable on her knees.  She also complains of pain in multiple joints in including her wrists and ankles.  She has never seen a rheumatologist.  She denies constitutional symptoms including fever, chills.  She has nighttime pain and rest pain.\par \par Pain is rated 8/10, described as throbbing/shooting, improved with rest, worse with walking. [8] : a current pain level of 8/10

## 2020-03-04 ENCOUNTER — APPOINTMENT (OUTPATIENT)
Dept: RHEUMATOLOGY | Facility: CLINIC | Age: 65
End: 2020-03-04
Payer: MEDICAID

## 2020-03-04 VITALS
TEMPERATURE: 98.1 F | OXYGEN SATURATION: 95 % | SYSTOLIC BLOOD PRESSURE: 137 MMHG | WEIGHT: 197 LBS | HEART RATE: 89 BPM | BODY MASS INDEX: 31.66 KG/M2 | DIASTOLIC BLOOD PRESSURE: 62 MMHG | HEIGHT: 66 IN

## 2020-03-04 PROCEDURE — 36415 COLL VENOUS BLD VENIPUNCTURE: CPT

## 2020-03-04 PROCEDURE — 99204 OFFICE O/P NEW MOD 45 MIN: CPT | Mod: 25

## 2020-03-04 RX ORDER — SITAGLIPTIN 100 MG/1
100 TABLET, FILM COATED ORAL
Refills: 0 | Status: ACTIVE | COMMUNITY

## 2020-03-04 RX ORDER — ADALIMUMAB 40MG/0.8ML
40 KIT SUBCUTANEOUS
Refills: 0 | Status: ACTIVE | COMMUNITY

## 2020-03-04 NOTE — PROCEDURE
[FreeTextEntry1] : This is a 64-year-old woman she presents for a initial evaluation she has had knee pain bilaterally worse on the right than the left she is also at other joint pains she does have a history of Crohn's disease she had been on a biologic in the past but not currently she is trying to get reapproval of it she also is very active Crohn's disease with increasing GI symptomatology there is no history of psoriasis there is no history of weight loss.I explained to her that I suspect this is a component of inflammatory bowel disease associated arthritis and appropriate treatment for her Crohn's disease but hopefully result in significant improvement of her joint symptoms nonetheless I am doing some additional serologies to exclude other possible causes of her joint issues I am also awaiting review of the MRI she is apparently having it done of both knees in the next few days.I plan to discuss all results with her when they're completed

## 2020-03-04 NOTE — REVIEW OF SYSTEMS
[Feeling Poorly] : feeling poorly [Feeling Tired] : feeling tired [Abdominal Pain] : abdominal pain [Diarrhea] : diarrhea [Arthralgias] : arthralgias [Joint Pain] : joint pain [Joint Swelling] : joint swelling [Joint Stiffness] : joint stiffness [Limb Swelling] : limb swelling [Eye Pain] : no eye pain [Red Eyes] : eyes not red [Dry Eyes] : no dryness of the eyes [Sore Throat] : no sore throat [Hoarseness] : no hoarseness [Chest Pain] : no chest pain [Palpitations] : no palpitations [Lower Ext Edema] : no lower extremity edema [Shortness Of Breath] : no shortness of breath [Wheezing] : no wheezing [Cough] : no cough [SOB on Exertion] : no shortness of breath during exertion [Skin Lesions] : no skin lesions [Itching] : no itching [Dizziness] : no dizziness [Feelings Of Weakness] : no feelings of weakness [Easy Bleeding] : no tendency for easy bleeding [Easy Bruising] : no tendency for easy bruising [de-identified] : no psoriasis

## 2020-03-04 NOTE — HISTORY OF PRESENT ILLNESS
[FreeTextEntry1] : This is a 64-year-old woman she comes for evaluation she has been referred by her orthopedic knee surgeon because of concern for arthritis she reports she's had knee pain in both knees for at least a year she also has pain in other joints including her elbows her knees and most recently her shoulders. She has had left foot issues however has had 3 foot surgeries within the past year or so she does have a history of Crohn's disease she was started on Humira in June of 2019 which apparently helped but she's been off it now for some time due to insurance issues changing of positions she is awaiting preapproval she does report more recent increase Crohn's activity with increasing abdominal pain increasing diarrhea She has no psoriasis she has no skin rashes she is not aware of any family history of any psoriasis Crohn's disease or ulcerative colitis prior to her evaluation by me she was seen by another rheumatologist she was given colchicine she reports on this medication she had increasing diarrhea.She has had x-rays of her knees she is scheduled for an MRI of her knee in the next few days.

## 2020-03-04 NOTE — PHYSICAL EXAM
[General Appearance - Alert] : alert [General Appearance - In No Acute Distress] : in no acute distress [General Appearance - Well Nourished] : well nourished [General Appearance - Well Developed] : well developed [Sclera] : the sclera and conjunctiva were normal [PERRL With Normal Accommodation] : pupils were equal in size, round, and reactive to light [Examination Of The Oral Cavity] : the lips and gums were normal [Oropharynx] : the oropharynx was normal [Neck Appearance] : the appearance of the neck was normal [Neck Cervical Mass (___cm)] : no neck mass was observed [Respiration, Rhythm And Depth] : normal respiratory rhythm and effort [Auscultation Breath Sounds / Voice Sounds] : lungs were clear to auscultation bilaterally [Heart Rate And Rhythm] : heart rate was normal and rhythm regular [Heart Sounds] : normal S1 and S2 [Murmurs] : no murmurs [Edema] : there was no peripheral edema [No CVA Tenderness] : no ~M costovertebral angle tenderness [No Spinal Tenderness] : no spinal tenderness [Abnormal Walk] : normal gait [Musculoskeletal - Swelling] : no joint swelling seen [Motor Tone] : muscle strength and tone were normal [Skin Color & Pigmentation] : normal skin color and pigmentation [] : no rash [Motor Exam] : the motor exam was normal [No Focal Deficits] : no focal deficits [FreeTextEntry1] : right knee - warm / small effusion tender left basal joint

## 2020-03-05 LAB
ALBUMIN SERPL ELPH-MCNC: 4.7 G/DL
ALP BLD-CCNC: 115 U/L
ALT SERPL-CCNC: 22 U/L
ANION GAP SERPL CALC-SCNC: 16 MMOL/L
AST SERPL-CCNC: 16 U/L
BASOPHILS # BLD AUTO: 0.07 K/UL
BASOPHILS NFR BLD AUTO: 0.8 %
BILIRUB SERPL-MCNC: 0.4 MG/DL
BUN SERPL-MCNC: 18 MG/DL
CALCIUM SERPL-MCNC: 10.2 MG/DL
CCP AB SER IA-ACNC: <8 UNITS
CHLORIDE SERPL-SCNC: 104 MMOL/L
CO2 SERPL-SCNC: 22 MMOL/L
CREAT SERPL-MCNC: 0.96 MG/DL
CRP SERPL-MCNC: 0.37 MG/DL
EOSINOPHIL # BLD AUTO: 0.39 K/UL
EOSINOPHIL NFR BLD AUTO: 4.5 %
ERYTHROCYTE [SEDIMENTATION RATE] IN BLOOD BY WESTERGREN METHOD: 56 MM/HR
GLUCOSE SERPL-MCNC: 202 MG/DL
HCT VFR BLD CALC: 43.7 %
HGB BLD-MCNC: 13.5 G/DL
IMM GRANULOCYTES NFR BLD AUTO: 0.3 %
LYMPHOCYTES # BLD AUTO: 2.63 K/UL
LYMPHOCYTES NFR BLD AUTO: 30.3 %
MAN DIFF?: NORMAL
MCHC RBC-ENTMCNC: 28.1 PG
MCHC RBC-ENTMCNC: 30.9 GM/DL
MCV RBC AUTO: 91 FL
MONOCYTES # BLD AUTO: 0.64 K/UL
MONOCYTES NFR BLD AUTO: 7.4 %
NEUTROPHILS # BLD AUTO: 4.91 K/UL
NEUTROPHILS NFR BLD AUTO: 56.7 %
PLATELET # BLD AUTO: 358 K/UL
POTASSIUM SERPL-SCNC: 4.4 MMOL/L
PROT SERPL-MCNC: 7.8 G/DL
RBC # BLD: 4.8 M/UL
RBC # FLD: 14.2 %
RF+CCP IGG SER-IMP: NEGATIVE
RHEUMATOID FACT SER QL: <10 IU/ML
SODIUM SERPL-SCNC: 141 MMOL/L
WBC # FLD AUTO: 8.67 K/UL

## 2020-03-15 RX ORDER — BUDESONIDE AND FORMOTEROL FUMARATE DIHYDRATE 160; 4.5 UG/1; UG/1
160-4.5 AEROSOL RESPIRATORY (INHALATION)
Qty: 1 | Refills: 11 | Status: ACTIVE | OUTPATIENT
Start: 2017-06-19

## 2020-04-06 RX ORDER — BUDESONIDE AND FORMOTEROL FUMARATE DIHYDRATE 160; 4.5 UG/1; UG/1
160-4.5 AEROSOL RESPIRATORY (INHALATION)
Qty: 10.2 | Refills: 7 | Status: ACTIVE | COMMUNITY
Start: 2018-06-29 | End: 1900-01-01

## 2020-04-06 RX ORDER — ALBUTEROL SULFATE 2.5 MG/3ML
(2.5 MG/3ML) SOLUTION RESPIRATORY (INHALATION)
Qty: 1 | Refills: 3 | Status: ACTIVE | COMMUNITY

## 2020-04-17 NOTE — HISTORY OF PRESENT ILLNESS
[FreeTextEntry1] : patient status post foot surgery.  since being on cinquair  ( reslizumab) she has been able to get of prednisone and her asthma, is much better.  no longer in the ER ( was in ER [facility]) and doing superbly in terms of her asthma,  will like to switch her to fasenra, similar effects but cost and time saving.\par \par she is concerned that she is about to lose her insurance!!!

## 2020-04-17 NOTE — PHYSICAL EXAM
[General Appearance - Well Developed] : well developed [Normal Appearance] : normal appearance [Well Groomed] : well groomed [General Appearance - Well Nourished] : well nourished [No Deformities] : no deformities [General Appearance - In No Acute Distress] : no acute distress [Normal Conjunctiva] : the conjunctiva exhibited no abnormalities [Eyelids - No Xanthelasma] : the eyelids demonstrated no xanthelasmas [Normal Oropharynx] : normal oropharynx [Neck Appearance] : the appearance of the neck was normal [Neck Cervical Mass (___cm)] : no neck mass was observed [Jugular Venous Distention Increased] : there was no jugular-venous distention [Thyroid Diffuse Enlargement] : the thyroid was not enlarged [Thyroid Nodule] : there were no palpable thyroid nodules [Respiration, Rhythm And Depth] : normal respiratory rhythm and effort [Exaggerated Use Of Accessory Muscles For Inspiration] : no accessory muscle use [Auscultation Breath Sounds / Voice Sounds] : lungs were clear to auscultation bilaterally [Abnormal Walk] : normal gait [Gait - Sufficient For Exercise Testing] : the gait was sufficient for exercise testing [Nail Clubbing] : no clubbing of the fingernails [Petechial Hemorrhages (___cm)] : no petechial hemorrhages [Cyanosis, Localized] : no localized cyanosis [] : no ischemic changes [Deep Tendon Reflexes (DTR)] : deep tendon reflexes were 2+ and symmetric [Sensation] : the sensory exam was normal to light touch and pinprick [No Focal Deficits] : no focal deficits [FreeTextEntry1] : clear  lungs despite significnat flow obstruction [FreeTextEntry2] : still reconvering from foot surgery

## 2020-04-17 NOTE — DISCUSSION/SUMMARY
[FreeTextEntry1] : her biggest problem is excessive weight,  recovery from surgery,  job instability and doesn't know ho much longer she'll have i nsurance\par \par her asthma however is well controlled on the cinqair and symbicort

## 2020-04-17 NOTE — REVIEW OF SYSTEMS
[Eye Irritation] : ~T irritation of the eyes [Nasal Congestion] : nasal congestion [Dyspnea] : dyspnea [Hay Fever] : hay fever [Negative] : Musculoskeletal [FreeTextEntry8] : spiroemtry is back to it's baseline.

## 2020-04-17 NOTE — REASON FOR VISIT
[Follow-Up] : a follow-up visit [FreeTextEntry1] : severe asthma, back on cinqair after extenisve foot surgery

## 2020-06-11 ENCOUNTER — APPOINTMENT (OUTPATIENT)
Dept: ORTHOPEDIC SURGERY | Facility: CLINIC | Age: 65
End: 2020-06-11
Payer: MEDICAID

## 2020-06-11 ENCOUNTER — RESULT REVIEW (OUTPATIENT)
Age: 65
End: 2020-06-11

## 2020-06-11 ENCOUNTER — OUTPATIENT (OUTPATIENT)
Dept: OUTPATIENT SERVICES | Facility: HOSPITAL | Age: 65
LOS: 1 days | End: 2020-06-11
Payer: COMMERCIAL

## 2020-06-11 VITALS
OXYGEN SATURATION: 98 % | WEIGHT: 197 LBS | TEMPERATURE: 97.6 F | HEART RATE: 104 BPM | BODY MASS INDEX: 31.66 KG/M2 | DIASTOLIC BLOOD PRESSURE: 54 MMHG | HEIGHT: 66 IN | SYSTOLIC BLOOD PRESSURE: 122 MMHG

## 2020-06-11 DIAGNOSIS — M19.90 UNSPECIFIED OSTEOARTHRITIS, UNSPECIFIED SITE: ICD-10-CM

## 2020-06-11 PROCEDURE — 73630 X-RAY EXAM OF FOOT: CPT

## 2020-06-11 PROCEDURE — 73110 X-RAY EXAM OF WRIST: CPT

## 2020-06-11 PROCEDURE — 20611 DRAIN/INJ JOINT/BURSA W/US: CPT | Mod: RT

## 2020-06-11 PROCEDURE — 73110 X-RAY EXAM OF WRIST: CPT | Mod: 26,LT

## 2020-06-11 PROCEDURE — 73600 X-RAY EXAM OF ANKLE: CPT

## 2020-06-11 PROCEDURE — 73630 X-RAY EXAM OF FOOT: CPT | Mod: 26,RT

## 2020-06-11 PROCEDURE — 99213 OFFICE O/P EST LOW 20 MIN: CPT | Mod: 25

## 2020-06-11 PROCEDURE — 73600 X-RAY EXAM OF ANKLE: CPT | Mod: 26,RT

## 2020-06-11 NOTE — HISTORY OF PRESENT ILLNESS
[de-identified] : The patient is a 64 year old woman with history of Crohn's disease, DM presenting for follow-up for bilateral knee pain, right worse than left.\par \par The patient was last seen about 3.5 months ago for a several month history of atraumatic, bilateral knee pain.  Her radiographs were consistent with arthritis, and she was referred to rheumatology to discuss rheumatologic cause for underlying arthritis.  She saw Dr. Bruce, and had bloodwork.  Serology showed mildly elevated ESR, but otherwise unremarkable.  She continues to have similar pain.  Her ROm has mildly improved.  She also had an MRI of the bilateral knees showing:\par \par MRI results for right knee showing complex tear of medial meniscus with extrusion of the medial meniscal body. Moderate medial, mild/moderate lateral, and severe patellofemoral joint osteoarthritis. \par MRI left knee showing complex degenerative tear of the medial meniscus with extrusion of the medial meniscal body. Mild insertional patellar tendinosis. Moderate medial, mild/moderate lateral, and severe patellofemoral joint osteoarthritis.\par \par \par Previous history: She has a history of Crohn's disease but has not seen her GI specialist for several months because of change in insurance/providers.  She initially saw a physician, Dr. Morris in August and had Xrays of bilateral knees showing medial compartment narrowing and erosive changes at the patellofemoral joint.  Apparently, she was possibly prescribed a gout medication to help with inflammation, but was discontinued by her PMD.  She is not a good candidate for NSAIDs given Crohn's disease.  Her pain has progressively worsened over the last few months.  She has a history of right ankle surgery, and since that surgery, she has been using a walking cane as an assist device.  She denies significant knee swelling or bruising.  She feels unstable on her knees.  She also complains of pain in multiple joints in including her wrists and ankles.  She has never seen a rheumatologist.  She denies constitutional symptoms including fever, chills.  She has nighttime pain and rest pain.\par

## 2020-06-11 NOTE — PHYSICAL EXAM
[de-identified] : General: Well-nourished, well-developed, alert, and in no acute distress.\par Head: Normocephalic.\par Eyes: Pupils equal round reactive to light and accommodation, extraocular muscles intact, normal sclera.\par Nose: No nasal discharge.\par Cardiac: Regular rate. Extremities are warm and well perfused. Distal pulses are symmetric bilaterally.\par Respiratory: No labored breathing.\par Extremities: Sensation is intact distally bilaterally.  Distal pulses are symmetric bilaterally\par Neurologic: No focal deficits\par Skin: Normal skin color, texture, and turgor\par Psychiatric: Normal affect\par \par RIGHT KNEE: \par \par Inspection: no bruising, erythema\par Joint Effusion:TRACE\par ROM: Knee Flexion 130 DEGREES WITH MILD PAIN AT END FLEXION, Knee Extension 0 DEGREES\par Palpation:MEDIAL JOINT LINE PAIN, MINIMAL PAIN AT MFC AND MEDIAL TIBIAL PLATEAU, PATELLAR FACET PAIN, No pain at patellar tendon, LFC, Lateral Tibial Plateau\par Leg Length Discrepancy:no\par Patella: MILDLY APPREHENSIVE\par Distal Pulses: normal\par Lower Extremity Strength:5-/5 KNEE EXTENSION 5-/5 KNEE EXTENSION\par Lower Extremity Reflexes:normal, 2+\par Lower Extremity Sensation: normal\par \par Special Tests:\par Mallory:EQUIVIOCAL\par Layla: POSITIVE MEDIALLY\par Anterior Drawer:Negative\par Posterior Drawer:Negative \par Varus/Valgus: PAIN WITH VALGUS STRESS, NO SIGNIFICANT GAPPING\par \par LEFT KNEE:\par \par Inspection: no bruising, swelling, erythema\par Joint Effusion:no \par ROM: Knee Flexion 130 , Knee Extension 0\par Palpation:MEDIAL JOINT LINE PAIN, MILD PAIN AT MEDIAL TIBIAL PLATEAU, No pain at patellar tendon, MFC/LFC, Lateral Tibial Plateau\par Leg Length Discrepancy:no\par Patella: no apprehension\par Distal Pulses: normal\par Lower Extremity Strength:normal, 5/5 \par Lower Extremity Reflexes:normal, 2+\par Lower Extremity Sensation: normal\par \par Special Tests:\par Mallory:Negative \par Layla: Negative\par Anterior Drawer:Negative\par Posterior Drawer:Negative \par Varus/Valgus:Negative, no instability\par  [de-identified] : MRI results for right knee showing complex tear of medial meniscus with extrusion of the medial meniscal body. Moderate medial, mild/moderate lateral, and severe patellofemoral joint osteoarthritis. \par \par MRI left knee showing complex degenerative tear of the medial meniscus with extrusion of the medial meniscal body. Mild insertional patellar tendinosis. Moderate medial, mild/moderate lateral, and severe patellofemoral joint osteoarthritis.

## 2020-06-11 NOTE — DISCUSSION/SUMMARY
[Medication Risks Reviewed] : Medication risks reviewed [de-identified] : The patient is a 64 year old woman with history of Crohn's Disease, DM, presenting with a several month history of severe, atraumatic, bilateral knee pain, right worse than left.  She has evidence of bilateral knee osteoarthritis.\par \par Imaging was reviewed with the patient in detail.  All questions were answered appropriately.\par \par After informed consent, and explanation of risks, benefits, alternatives, adverse effects of injection, which includes but is not limited to infection, bleeding, allergic reaction, swelling, failure to improve symptoms, the patient would like to proceed with the procedure - RIGHT KNEE ULTRASOUND GUIDED CORTICOSTEROID INJECTION . See procedure note above. Patient tolerated the procedure well. The patient was provided with postinjection instructions.\par \par We discussed potential for HA injections.  Patient would like to think about it.\par \par Continue home exercise program.  The patient was counseled on activity modification.\par \par She also complains of right ankle and left thumb pain.  We will order Xrays and patient may return for visit at another time.\par \par Patient appreciates and agrees with current plan.\par \par This note was generated using dragon medical dictation software.  A reasonable effort has been made for proofreading its contents, but typos may still remain.  If there are any questions or points of clarification needed please notify my office.\par \par >15 minutes of time was spent in total for the encounter.  >50% of the time spent was on counseling/coordination of care and medical-decision making for this patient.\par \par

## 2020-06-11 NOTE — PROCEDURE
[de-identified] : Ultrasound-Guided RIGHT Knee Injection\par \par Indication for U/S Guidance: Ensure placement within the tibiofemoral joint for diagnostic purposes, while avoiding neurovascular structures\par \par Indication for Injection: Knee Osteoarthritis\par \par A discussion was had with the patient regarding this procedure and all questions were answered. All risks, benefits and alternatives were discussed. These included but were not limited to bleeding, infection, and allergic reaction. A timeout was done to ensure correct side and pt agreed to the procedure. Betadine was used to sterilize and prep the area, and alcohol was used to clean the skin in the anterior aspect of the knee joint. The suprapatellar space was visualized utilizing the Sonosite, linear transducer. The joint was visualized in the short axis and an in-plane approach was used for the injection. Ultrasound guidance was utilized to ensure accuracy of the intra-articular injection and avoid the neurovascular structures. A 22-gauge 1.5" needle was used to inject 4cc of 1% lidocaine without epi.  This was followed by injection with 1cc of KENALOG.   An image confirming the correct location of the needle placement and infusion of the steroid at the end of the injection was saved. A sterile bandage was then applied. The patient tolerated the procedure well and there were no complications.\par \par \par

## 2020-06-25 ENCOUNTER — APPOINTMENT (OUTPATIENT)
Dept: ORTHOPEDIC SURGERY | Facility: CLINIC | Age: 65
End: 2020-06-25
Payer: MEDICAID

## 2020-06-25 VITALS
SYSTOLIC BLOOD PRESSURE: 140 MMHG | BODY MASS INDEX: 31.66 KG/M2 | WEIGHT: 197 LBS | DIASTOLIC BLOOD PRESSURE: 68 MMHG | HEIGHT: 66 IN | OXYGEN SATURATION: 98 % | HEART RATE: 81 BPM

## 2020-06-25 DIAGNOSIS — M25.532 PAIN IN LEFT WRIST: ICD-10-CM

## 2020-06-25 DIAGNOSIS — M18.12 UNILATERAL PRIMARY OSTEOARTHRITIS OF FIRST CARPOMETACARPAL JOINT, LEFT HAND: ICD-10-CM

## 2020-06-25 PROCEDURE — 99213 OFFICE O/P EST LOW 20 MIN: CPT | Mod: 25

## 2020-06-25 PROCEDURE — 20611 DRAIN/INJ JOINT/BURSA W/US: CPT | Mod: LT

## 2020-06-25 NOTE — HISTORY OF PRESENT ILLNESS
[de-identified] : The patient is a 64 year old, RHD woman history of Crohn's disease, DM, presenting with left thumb pain.\par \par The patient presents with a several month history of acute on chronic, atraumatic, left radial sided wrist pain.  She denies significant stiffness.  She denies elbow or proximal wrist pain.  Pain is worse with thumb motion.  Her pain seems to be constant in nature.  She has been wearing a simple cock-up wrist brace.\par \par Pain is moderate in intensity, described as sharp, improved with rest, worse with thumb motion.

## 2020-06-25 NOTE — PHYSICAL EXAM
[de-identified] : General: Well-nourished, well-developed, alert, and in no acute distress.\par Head: Normocephalic.\par Eyes: Pupils equal round reactive to light and accommodation, extraocular muscles intact, normal sclera.\par Nose: No nasal discharge.\par Cardiac: Regular rate. Extremities are warm and well perfused. Distal pulses are symmetric bilaterally.\par Respiratory: No labored breathing.\par Extremities: Sensation is intact distally bilaterally.  Distal pulses are symmetric bilaterally\par Lymphatic: No regional lymphadenopathy, no lymphedema\par Neurologic: No focal deficits\par Skin: Normal skin color, texture, and turgor\par Psychiatric: Normal affect\par MSK: as noted above/below\par \par RIGHT Hand: \par APPEARANCE: no marked deformities, no swelling or malalignment\par Palpation: no pain at anatomical snuffbox, no pain at a scaphoid tubercle, no pain in CMC, no pain at scapholunate, no pain at lunotriquetrum, no pain at TFCC, no pain at proximal carpal row, no pain at distal carpal row, no pain at metacarpal, no pain at the  MCP, no pain at PIP, no pain at DIP\par ROM: Full ROM at MCP, PIP, DIP\par RESISTIVE TESTIN/5 \par SPECIAL TESTS:  normal flex/ext, abd/add, and opposition of thumb, no triggering of fingers\par PULSES: 2+ radial pulse\par NEURO: AIN, PIN, Ulnar nerve intact to motor\par SENSATION: Intact to light touch throughout\par \par LEFT Hand: \par APPEARANCE: no marked deformities, no swelling or malalignment\par Palpation: PAIN AT BASE OF THUMB AT CMC JOINT, no pain at anatomical snuffbox, no pain at a scaphoid tubercle, no pain at scapholunate, no pain at lunotriquetrum, no pain at TFCC, no pain at proximal carpal row, no pain at distal carpal row, no pain at metacarpal, no pain at the  MCP, no pain at PIP, no pain at DIP\par ROM: Full ROM at MCP, PIP, DIP\par RESISTIVE TESTIN/5 \par SPECIAL TESTS:  normal flex/ext, abd/add, and opposition of thumb, no triggering of fingers\par PULSES: 2+ radial pulse\par NEURO: AIN, PIN, Ulnar nerve intact to motor\par SENSATION: Intact to light touch throughout\par \par THUMB CMC GRIND TEST: POSITIVE\par  [de-identified] : Xray LEFT Wrist - Multiple views were reviewed with the patient in detail. \par \par FINDINGS: No acute fracture or dislocation. There is negative ulnar variance. The carpal alignment is intact. There are severe degenerative changes at the first CMC joint with complete loss of joint space at the radial side, marginal osteophytes, and subchondral cystic change. There is also partial subluxation at the first CMC joint. A lucency is present in the ulnar styloid possibly representing a cystic lesion.\par \par

## 2020-06-25 NOTE — PROCEDURE
[de-identified] : Ultrasound Guided Injection \par Indication: Ensure placement within a small osteoarthritic joint\par \par Utlizing the SonAMX II Edge portable ultrasound machine, the Linear 25, sterilized probe, ultrasound guidance with the probe in the long axis, utilizing an out-of-plane approach, was used for the following injection:\par \par Injection: Cortisone injection to the left 1st CMC joint\par Indication: Osteoarthritis.\par \par A discussion was had with the patient regarding this procedure and all questions were answered. All risks, benefits and alternatives were discussed. These included but were not limited to bleeding, infection, and allergic reaction. A timeout was performed prior to the procedure to ensure proper side and location.  Alcohol was used to clean and sterilize the skin over the lateral CMC joint. A 25-gauge needle was used to inject 0.5cc of 1% lidocaine and 1cc of 6mg/mL betamethasone into the joint. A sterile bandage was then applied. The patient tolerated the procedure well and there were no complications. \par \par

## 2020-06-25 NOTE — DISCUSSION/SUMMARY
[Medication Risks Reviewed] : Medication risks reviewed [de-identified] : The patient is a 64 year old, RHD woman with history of Crohn's Disease, DM, Knee OA presenting with chronic left thumb pain likely secondary to Thumb CMC Arthritis.\par \par Imaging/Diagnostics were interpreted and results were reviewed with the patient in detail.  All questions were answered appropriately.\par \par The patient was counseled on the natural progression of the problem today.  \par \par After informed consent, and explanation of risks, benefits, alternatives, adverse effects of injection, which includes but is not limited to infection, bleeding, allergic reaction, swelling, failure to improve symptoms, the patient would like to proceed with the procedure - LEFT THUMB CMC ULTRASOUND-GUIDED CORTICOSTEROID INJECTION. See procedure note above. Patient tolerated the procedure well. The patient was provided with postinjection instructions.\par \par She was given prescription for thumb spica brace.\par \par  The patient was also provided some general home exercises.  The patient was counseled on activity modification.\par \par Follow-up in 6-8 weeks.  If symptoms persist, we discussed referral to hand/wrist surgery.\par \par ------------------------------------------------------------------------------------------------------------------\par Patient appreciates and agrees with current plan.\par \par This note was generated using a mixture of manual typing and dragon medical dictation software.  A reasonable effort has been made for proofreading its contents, but typos may still remain.  If there are any questions or points of clarification needed please notify my office.\par \par \par >15 minutes of time was spent in total for the encounter.  >50% of the time spent was on counseling/coordination of care and medical-decision making for this patient.\par

## 2020-06-26 ENCOUNTER — RX RENEWAL (OUTPATIENT)
Age: 65
End: 2020-06-26

## 2020-07-13 ENCOUNTER — TRANSCRIPTION ENCOUNTER (OUTPATIENT)
Age: 65
End: 2020-07-13

## 2020-07-14 ENCOUNTER — APPOINTMENT (OUTPATIENT)
Dept: PULMONOLOGY | Facility: CLINIC | Age: 65
End: 2020-07-14
Payer: MEDICAID

## 2020-07-14 VITALS
OXYGEN SATURATION: 96 % | SYSTOLIC BLOOD PRESSURE: 130 MMHG | HEART RATE: 76 BPM | HEIGHT: 66 IN | DIASTOLIC BLOOD PRESSURE: 72 MMHG | TEMPERATURE: 98 F

## 2020-07-14 PROCEDURE — 94010 BREATHING CAPACITY TEST: CPT

## 2020-07-14 PROCEDURE — 99214 OFFICE O/P EST MOD 30 MIN: CPT | Mod: 25

## 2020-07-15 NOTE — PHYSICAL EXAM
[No Acute Distress] : no acute distress [Normal Oropharynx] : normal oropharynx [No Neck Mass] : no neck mass [Normal Appearance] : normal appearance [Normal S1, S2] : normal s1, s2 [No Murmurs] : no murmurs [Normal Rate/Rhythm] : normal rate/rhythm [No Abnormalities] : no abnormalities [No Clubbing] : no clubbing [Normal Gait] : normal gait [Normal Color/ Pigmentation] : normal color/ pigmentation [No Edema] : no edema [No Focal Deficits] : no focal deficits [Oriented x3] : oriented x3 [Normal Affect] : normal affect

## 2020-07-15 NOTE — REVIEW OF SYSTEMS
[Cough] : cough [Fatigue] : fatigue [Seasonal Allergies] : seasonal allergies [Hay Fever] : hay fever [Dyspnea] : dyspnea [Negative] : Endocrine

## 2020-07-15 NOTE — HISTORY OF PRESENT ILLNESS
[TextBox_4] : on fasenra second dose feels that it's beginning to kick in.  she is a remodeled asthmatic who felt better when on cinqair then she lost her job, injured her foot, lost her insurance and stopped te cinqir, now is on fasenra, but has not regained her sense of well being she had when in cinquair.  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 she also had elevated niox also appears to seem depressed.    in the past she never got a real improvement in her fev1 but her eosinophilic phenotype allowed her to feel better on an IL5 inhibitor

## 2020-07-15 NOTE — DISCUSSION/SUMMARY
[FreeTextEntry1] : i'm hoping with the third dose of fasnera she'll start feeling better.\par \par may have to return to cinquair\par \par overlap syndrome no copd alone may be a problem\par \par also she is over weight and with cinqari there was a weight adjustment.

## 2020-08-25 ENCOUNTER — RX RENEWAL (OUTPATIENT)
Age: 65
End: 2020-08-25

## 2020-08-25 RX ORDER — BENRALIZUMAB 30 MG/ML
30 INJECTION, SOLUTION SUBCUTANEOUS
Qty: 1 | Refills: 6 | Status: ACTIVE | COMMUNITY
Start: 2020-06-26 | End: 1900-01-01

## 2020-10-12 ENCOUNTER — APPOINTMENT (OUTPATIENT)
Dept: ORTHOPEDIC SURGERY | Facility: CLINIC | Age: 65
End: 2020-10-12
Payer: MEDICAID

## 2020-10-12 VITALS
SYSTOLIC BLOOD PRESSURE: 152 MMHG | HEART RATE: 92 BPM | HEIGHT: 66 IN | OXYGEN SATURATION: 98 % | WEIGHT: 197 LBS | BODY MASS INDEX: 31.66 KG/M2 | DIASTOLIC BLOOD PRESSURE: 74 MMHG

## 2020-10-12 DIAGNOSIS — M17.11 UNILATERAL PRIMARY OSTEOARTHRITIS, RIGHT KNEE: ICD-10-CM

## 2020-10-12 PROCEDURE — 20611 DRAIN/INJ JOINT/BURSA W/US: CPT | Mod: RT

## 2020-10-12 PROCEDURE — 99213 OFFICE O/P EST LOW 20 MIN: CPT | Mod: 25

## 2020-10-12 RX ORDER — OMEPRAZOLE 40 MG/1
40 CAPSULE, DELAYED RELEASE ORAL
Qty: 30 | Refills: 0 | Status: ACTIVE | COMMUNITY
Start: 2020-02-06

## 2020-10-12 RX ORDER — ALBUTEROL SULFATE 90 UG/1
108 (90 BASE) INHALANT RESPIRATORY (INHALATION)
Qty: 18 | Refills: 0 | Status: ACTIVE | COMMUNITY
Start: 2020-04-24

## 2020-10-12 RX ORDER — HYALURONATE SODIUM, STABILIZED 88 MG/4 ML
88 SYRINGE (ML) INTRAARTICULAR
Qty: 2 | Refills: 0 | Status: ACTIVE | COMMUNITY
Start: 2020-10-12

## 2020-10-12 RX ORDER — ALOGLIPTIN 25 MG/1
25 TABLET, FILM COATED ORAL
Qty: 30 | Refills: 0 | Status: ACTIVE | COMMUNITY
Start: 2020-03-30

## 2020-10-12 RX ORDER — METOPROLOL SUCCINATE 25 MG/1
25 TABLET, EXTENDED RELEASE ORAL
Qty: 30 | Refills: 0 | Status: ACTIVE | COMMUNITY
Start: 2020-07-31

## 2020-10-12 RX ORDER — OMEPRAZOLE 20 MG/1
20 CAPSULE, DELAYED RELEASE ORAL
Qty: 60 | Refills: 0 | Status: ACTIVE | COMMUNITY
Start: 2020-06-19

## 2020-10-12 RX ORDER — LANCETS
EACH MISCELLANEOUS
Qty: 100 | Refills: 0 | Status: ACTIVE | COMMUNITY
Start: 2020-01-22

## 2020-10-12 RX ORDER — ERTUGLIFLOZIN 15 MG/1
15 TABLET, FILM COATED ORAL
Qty: 30 | Refills: 0 | Status: ACTIVE | COMMUNITY
Start: 2020-07-31

## 2020-10-12 RX ORDER — HYALURONATE SODIUM, STABILIZED 88 MG/4 ML
88 SYRINGE (ML) INTRAARTICULAR
Qty: 2 | Refills: 0 | Status: ACTIVE | COMMUNITY
Start: 2020-10-12 | End: 1900-01-01

## 2020-10-12 RX ORDER — ISOPROPYL ALCOHOL 70 ML/100ML
70 SWAB TOPICAL
Qty: 100 | Refills: 0 | Status: ACTIVE | COMMUNITY
Start: 2020-01-28

## 2020-10-12 RX ORDER — ADALIMUMAB 40MG/0.8ML
40 KIT SUBCUTANEOUS
Qty: 2 | Refills: 0 | Status: ACTIVE | COMMUNITY
Start: 2020-02-06

## 2020-10-12 RX ORDER — METFORMIN HYDROCHLORIDE 500 MG/1
500 TABLET, COATED ORAL
Qty: 60 | Refills: 0 | Status: ACTIVE | COMMUNITY
Start: 2020-07-30

## 2020-10-12 RX ORDER — GLIMEPIRIDE 4 MG/1
4 TABLET ORAL
Qty: 30 | Refills: 0 | Status: ACTIVE | COMMUNITY
Start: 2019-12-18

## 2020-10-12 RX ORDER — AMOXICILLIN 500 MG/1
500 CAPSULE ORAL
Qty: 30 | Refills: 0 | Status: ACTIVE | COMMUNITY
Start: 2020-09-17

## 2020-10-12 NOTE — PHYSICAL EXAM
[de-identified] : General: Well-nourished, well-developed, alert, and in no acute distress.\par Head: Normocephalic.\par Eyes: Pupils equal round reactive to light and accommodation, extraocular muscles intact, normal sclera.\par Nose: No nasal discharge.\par Cardiac: Regular rate. Extremities are warm and well perfused. Distal pulses are symmetric bilaterally.\par Respiratory: No labored breathing.\par Extremities: Sensation is intact distally bilaterally. Distal pulses are symmetric bilaterally\par Neurologic: No focal deficits\par Skin: Normal skin color, texture, and turgor\par Psychiatric: Normal affect\par \par RIGHT KNEE: \par \par Inspection: no bruising, erythema\par Joint Effusion:TRACE\par ROM: Knee Flexion 130 DEGREES WITH MILD PAIN AT END FLEXION, Knee Extension 0 DEGREES\par Palpation:MEDIAL JOINT LINE PAIN, MINIMAL PAIN AT MFC AND MEDIAL TIBIAL PLATEAU, PATELLAR FACET PAIN, No pain at patellar tendon, LFC, Lateral Tibial Plateau\par Leg Length Discrepancy:no\par Patella: MILDLY APPREHENSIVE\par Distal Pulses: normal\par Lower Extremity Strength:5-/5 KNEE EXTENSION 5-/5 KNEE EXTENSION\par Lower Extremity Reflexes:normal, 2+\par Lower Extremity Sensation: normal\par \par Special Tests:\par Mallory:EQUIVIOCAL\par Layla: POSITIVE MEDIALLY\par Anterior Drawer:Negative\par Posterior Drawer:Negative \par Varus/Valgus: PAIN WITH VALGUS STRESS, NO SIGNIFICANT GAPPING\par \par LEFT KNEE:\par \par Inspection: no bruising, swelling, erythema\par Joint Effusion:no \par ROM: Knee Flexion 130 , Knee Extension 0\par Palpation:MEDIAL JOINT LINE PAIN, MILD PAIN AT MEDIAL TIBIAL PLATEAU, No pain at patellar tendon, MFC/LFC, Lateral Tibial Plateau\par Leg Length Discrepancy:no\par Patella: no apprehension\par Distal Pulses: normal\par Lower Extremity Strength:normal, 5/5 \par Lower Extremity Reflexes:normal, 2+\par Lower Extremity Sensation: normal\par \par Special Tests:\par Mallory:Negative \par Layla: Negative\par Anterior Drawer:Negative\par Posterior Drawer:Negative \par Varus/Valgus:Negative, no instability

## 2020-10-12 NOTE — DISCUSSION/SUMMARY
"Subjective:       Patient ID: Diana Escobar is a 52 y.o. female.    Chief Complaint: inflammatory polyarthritis; Positive STEVE; and Fibromyalgia    HPI   Here for routine fup; I saw her 3 weeks ago and at that point 4 weeks earlier she developed polyarthritis in hands, unable to make fist, MCPS and PIPS involvement. No fmhx of RA. Exam revealed swelling of MCPs and PIPS along with CMC joint pain and MTP squeeze test positive.   Also with the history was previously seeing Dr. Sinclair and had positive CCP, STEVE 1:160 speckled with negative profile. Her CCP, RF and inflammatory markers were all normal here. I put her on medrol dosepack to give her a trial and in a follow-up email she said the dosepack helped a lot in her hands 100% relief in swelling and stiffness. I then started her on prednisone 10mg daily and today she is back to discuss DMARD Therapy.     Since the last visit:  2/21/ she went to work she felt funny, light headed, vision on R side kept getting blurry, she had some white out. She thought her sugar was low, high so she went to lunch and got food. At that point she felt worse and the whole right side of the body was feeling funny.   She went to [facility] in [place], [place]. She was admitted for a week.     She had a TIA in the past 2 years ago- at this point she had a real bad headache and blood pressure was high.     She is on cholesterol medicine, BP medicine.   New medicine is plavix, magnesium, B12. that she is on and she is already noticing bruising everywhere. She noticed some weakness on the right side. The right side vision she still has "ice cream freeze" she will have a shooting pain in the temple on the Right side. No jaw pain or claudications. No arm claudication.   She is smoking 1/2 to  1 pack per day.     She is still doing great on 10 mg of prednisone no joint pain at all.     She does not drink any alcohol    Review of Systems   Eyes:        Dry eyes chronic     Respiratory: Negative for " [Medication Risks Reviewed] : Medication risks reviewed "cough and shortness of breath.    Cardiovascular: Negative for chest pain.   Genitourinary: Negative for hematuria.   Musculoskeletal: Negative for arthralgias.        Calf pain significantly improved since increasing gabapentin, starting magnesium, B12.    Skin: Negative for color change and rash.   Hematological: Bruises/bleeds easily (bruising from plavix).           Objective:   BP (!) 130/93  Pulse 92  Ht 5' 2" (1.575 m)  Wt 86.5 kg (190 lb 11.2 oz)  BMI 34.88 kg/m2     Physical Exam   Vitals reviewed.  Constitutional: She is oriented to person, place, and time and well-developed, well-nourished, and in no distress. No distress.   HENT:   Head: Normocephalic and atraumatic.   Right Ear: External ear normal.   Left Ear: External ear normal.   Eyes: Conjunctivae are normal. Right eye exhibits no discharge. Left eye exhibits no discharge. No scleral icterus.   Neck: Neck supple.   Cardiovascular: Normal rate, regular rhythm and normal heart sounds.    No subclavian bruits     Pulmonary/Chest: Effort normal. No respiratory distress.   Abdominal: Soft. There is no tenderness.   Lymphadenopathy:     She has no cervical adenopathy.   Neurological: She is alert and oriented to person, place, and time. No cranial nerve deficit. She exhibits normal muscle tone.   Strength 5/5 in UE and LE proximal distributions   Skin: Skin is warm and dry. No rash noted. She is not diaphoretic. No erythema.     Psychiatric: Mood, memory, affect and judgment normal.   Musculoskeletal: Normal range of motion. She exhibits no edema, tenderness or deformity.   No synovitis today!  Negative MTP squeeze test            LABS REVIEWED  -RF, CCP  Hep serologies  Quant gold     Ref. Range 6/30/2016 10:53 11/10/2016 09:34   Cholesterol Latest Ref Range: 120 - 199 mg/dL 285 (H) 196   HDL Latest Ref Range: 40 - 75 mg/dL 44 41   LDL Cholesterol Latest Ref Range: 63.0 - 159.0 mg/dL 196.6 (H) 117.0   Total Cholesterol/HDL Ratio Latest Ref Range: " [de-identified] :  The patient is a 64 year old woman with history of Crohn's Disease, DM, presenting with a several month history of severe, atraumatic, bilateral knee pain, right worse than left. She has evidence of bilateral knee osteoarthritis.  Also element of inflammatory arthritis in setting of Crohn's Disease.\par \par We again discussed treatment options of knee arthritis.\par \par After informed consent, and explanation of risks, benefits, alternatives, adverse effects of injection, which includes but is not limited to infection, bleeding, allergic reaction, swelling, soft tissue weakening/tendon rupture, injection site complication, fat atrophy, skin depigmentation, failure to improve symptoms, the patient would like to proceed with the procedure - RIGHT KNEE ULTRASOUND-GUIDED CORTICOSTEROID INJECTION. See procedure note above. Patient tolerated the procedure well. The patient was provided with postinjection instructions.\par \par The patient was also provided some general home exercises.  The patient was counseled on activity modification.\par \par We discussed trial of HA injections.  Bilateral Monovisc injections ordered today.\par \par Follow-up for HA injections.\par \par This note was generated using dragon medical dictation software. A reasonable effort has been made for proofreading its contents, but typos may still remain. If there are any questions or points of clarification needed please notify my office.\par \par >15 minutes of time was spent in total for the encounter. >50% of the time spent was on counseling/coordination of care and medical-decision making for this patient. 2.0 - 5.0  6.5 (H) 4.8   Triglycerides Latest Ref Range: 30 - 150 mg/dL 222 (H) 190 (H)     Results for CHING RAGSDALE (MRN [id number]) as of 3/2/2017 12:00   Ref. Range 2/7/2017 11:18   WBC Latest Ref Range: 3.90 - 12.70 K/uL 6.68   RBC Latest Ref Range: 4.00 - 5.40 M/uL 4.67   Hemoglobin Latest Ref Range: 12.0 - 16.0 g/dL 14.8   Hematocrit Latest Ref Range: 37.0 - 48.5 % 46.0   MCV Latest Ref Range: 82 - 98 fL 99 (H)   MCH Latest Ref Range: 27.0 - 31.0 pg 31.7 (H)   MCHC Latest Ref Range: 32.0 - 36.0 % 32.2   RDW Latest Ref Range: 11.5 - 14.5 % 13.8   Platelets Latest Ref Range: 150 - 350 K/uL 168   MPV Latest Ref Range: 9.2 - 12.9 fL 12.0   Gran% Latest Ref Range: 38.0 - 73.0 % 62.5   Gran # Latest Ref Range: 1.8 - 7.7 K/uL 4.1   Lymph% Latest Ref Range: 18.0 - 48.0 % 28.9   Lymph # Latest Ref Range: 1.0 - 4.8 K/uL 1.9   Mono% Latest Ref Range: 4.0 - 15.0 % 5.5   Mono # Latest Ref Range: 0.3 - 1.0 K/uL 0.4   Eosinophil% Latest Ref Range: 0.0 - 8.0 % 3.1   Eos # Latest Ref Range: 0.0 - 0.5 K/uL 0.2   Basophil% Latest Ref Range: 0.0 - 1.9 % 0.7   Baso # Latest Ref Range: 0.00 - 0.20 K/uL 0.05   Sed Rate Latest Ref Range: 0 - 20 mm/Hr 3   Sodium Latest Ref Range: 136 - 145 mmol/L 141   Potassium Latest Ref Range: 3.5 - 5.1 mmol/L 4.2   Chloride Latest Ref Range: 95 - 110 mmol/L 104   CO2 Latest Ref Range: 23 - 29 mmol/L 28   Anion Gap Latest Ref Range: 8 - 16 mmol/L 9   BUN, Bld Latest Ref Range: 6 - 20 mg/dL 10   Creatinine Latest Ref Range: 0.5 - 1.4 mg/dL 0.7   eGFR if non African American Latest Ref Range: >60 mL/min/1.73 m^2 >60   eGFR if  Latest Ref Range: >60 mL/min/1.73 m^2 >60   Glucose Latest Ref Range: 70 - 110 mg/dL 95   Calcium Latest Ref Range: 8.7 - 10.5 mg/dL 9.4   Alkaline Phosphatase Latest Ref Range: 55 - 135 U/L 85   Total Protein Latest Ref Range: 6.0 - 8.4 g/dL 7.2   Albumin Latest Ref Range: 3.5 - 5.2 g/dL 4.1   Total Bilirubin Latest Ref Range: 0.1 - 1.0 mg/dL 0.3   AST  Latest Ref Range: 10 - 40 U/L 16   ALT Latest Ref Range: 10 - 44 U/L 15   CRP Latest Ref Range: 0.0 - 8.2 mg/L 3.3     I personally viewed the xrays with my impressions below:  CXR normal  Hx cervical surgery  xrays with mild degenerative change  No erosions    IMAGING REVIEWED   MRI Cervical without contrast limited study  MRI angiogram neck  . No significant vascular disease. Specifically, no evidence of vascular occlusion.    2. No acute intracranial abnormality.    3. Prominent lingular tonsillar tissues which could be reactive or inflammatory/infectious in etiology.    4. Minimal chronic paranasal sinus disease.    Carotid studies  FINDINGS:  Real-time sonographic imaging was obtained of the carotid systems using grayscale and color Doppler techniques. The vertebral arteries were assessed.     Velocities are as follows (cm/sec):    RIGHT:  ICA superior: 127  ICA mid: 144  ICA inferior: 118  ECA: 132  Bulb: 78  CCA superior: 89  CCA mid: 87  CCA inferior: 119  Vertebral: 65  ICA/CCA: 1.6    LEFT:  ICA superior: 76  ICA mid: 68  ICA inferior: 83  ECA: 107  Bulb: 47  CCA superior: 88  CCA mid: 85  CCA inferior: 102  Vertebral: 61  ICA/CCA: 0.9    COMMENTS:  No significant plaque is detected.    IMPRESSION:    By peak systolic velocity criteria, there is a 50-69% stenosis of the right internal carotid artery.    No hemodynamically significant stenosis of the left internal carotid artery.    Assessment:       1. Rheumatoid arthritis of multiple sites with negative rheumatoid factor    2. Positive STEVE (antinuclear antibody)    3. Hx-TIA (transient ischemic attack)    4. Fibromyalgia    5. Tobacco use    6. Combined hyperlipidemia associated with type 2 diabetes mellitus    7. Hypertension associated with diabetes    8. High risk medication use        RA- diagnosed based on polyarthritis small joints of the hands, >6week duration, previous CCP positive now RF/CCP negative, normal inflammatory markers (do not  correspond to her joint inflammation), full response to prednisone 10mg daily. CDAI 0 today!  Maternal grandfather with RA.  Active smoker, normal CXR.    Recent TIA versus complex migraines- currently undergoing work-up for neurology but leaning towards TIA than migraines. Has follow-up in 3 months.   Needs excellent control of cardiovascular risk factors- quit smoking. Has 3 mo fup with neurology.     Tobacco- QUIT    FM- improved with increased gabapentin dosing 300mg TID. Doesn't need refills     HTN- stable, needs good follow-up    HL/DM- sugars controlled despite prednisone use    Positive STEVE- in setting of seronegative RA although she did have positive CCP. Nonerosive. Will check profile. May benefit from plaquenil.     High risk med use- starting methotrexate CXR normal, on steroids so needs bone protection.  No GERD.     Plan:     Check cbc, cmp, sed rate, crp     Start methotrexate 15mg weekly (6tabs) and folic acid daily  MTX hand out given and side effects discussed. If infection or on antibiotics stop the medicine.     Decrease prednisone to 7.5mg daily     Check C3, C4  B2, BLAISE  Urinalysis  pc ratio  ROMÁN    Start OTC vitamin D 2000 IU daily  Dietary calcium 1200mg daily    Continue neurology and PCP follow-up    Schedule DEXA    MTX toxicity labs in 4 weeks and then titrate up dose and repeat labs again in 8 weeks with possibly starting plaquenil at that time if unable to get off the methotrexate. Plaquenil hand out given-= she will hold on yearly eye exam until we start the plaquenil in order to get baseline check same day.     Anti inflammatory diet and stopping diet soda!  Quit smoking!    Up to date on vaccines      RTC in 3 months    MB            This was a complex visit. Multiple issues were addressed. 40 minutes spent,   including time needed to review the records, the patient evaluation,   documentations, face-to-face discussion with the patient. More than 50% of the   time was spent on  counseling and coordination of care. Level V visit.

## 2020-10-12 NOTE — HISTORY OF PRESENT ILLNESS
[de-identified] : The patient is a 64 year old woman with history of Crohn's disease, DM presenting for follow-up for bilateral knee pain, right worse than left.\par \par The patient was last seen about 4 months ago for a several month history of atraumatic, bilateral knee pain. Her radiographs were consistent with arthritis, and she was referred to rheumatology to discuss rheumatologic cause for underlying arthritis. She saw Dr. Bruce, and had bloodwork. Serology showed mildly elevated ESR, but otherwise unremarkable. She had a right knee cortisone injection at her last visit which provided relief for nearly 4 months.\par \par  She also had an MRI of the bilateral knees showing:\par \par MRI results for right knee showing complex tear of medial meniscus with extrusion of the medial meniscal body. Moderate medial, mild/moderate lateral, and severe patellofemoral joint osteoarthritis. \par MRI left knee showing complex degenerative tear of the medial meniscus with extrusion of the medial meniscal body. Mild insertional patellar tendinosis. Moderate medial, mild/moderate lateral, and severe patellofemoral joint osteoarthritis.\par \par \par Previous history: She has a history of Crohn's disease but has not seen her GI specialist for several months because of change in insurance/providers. She initially saw a physician, Dr. Morris in August and had Xrays of bilateral knees showing medial compartment narrowing and erosive changes at the patellofemoral joint. Apparently, she was possibly prescribed a gout medication to help with inflammation, but was discontinued by her PMD. She is not a good candidate for NSAIDs given Crohn's disease. Her pain has progressively worsened over the last few months. She has a history of right ankle surgery, and since that surgery, she has been using a walking cane as an assist device. She denies significant knee swelling or bruising. She feels unstable on her knees. She also complains of pain in multiple joints in including her wrists and ankles. She has never seen a rheumatologist. She denies constitutional symptoms including fever, chills. She has nighttime pain and rest pain.\par

## 2020-10-12 NOTE — PROCEDURE
[de-identified] : Ultrasound-Guided RIGHT Knee Injection\par \par Indication for U/S Guidance: Ensure placement within the tibiofemoral joint for diagnostic purposes, while avoiding neurovascular structures\par \par Indication for Injection: Knee Osteoarthritis\par \par A discussion was had with the patient regarding this procedure and all questions were answered. All risks, benefits and alternatives were discussed. These included but were not limited to bleeding, infection, and allergic reaction. A timeout was done to ensure correct side and pt agreed to the procedure. Betadine was used to sterilize and prep the area, and alcohol was used to clean the skin in the anterior aspect of the knee joint. The suprapatellar space was visualized utilizing the Sonosite, linear transducer. The joint was visualized in the short axis and an in-plane approach was used for the injection. Ultrasound guidance was utilized to ensure accuracy of the intra-articular injection and avoid the neurovascular structures. A 22-gauge 1.5" needle was used to inject 4cc of 1% lidocaine without epi. This was followed by injection with 1cc of KENALOG. An image confirming the correct location of the needle placement and infusion of the steroid at the end of the injection was saved. A sterile bandage was then applied. The patient tolerated the procedure well and there were no complications.

## 2020-11-16 ENCOUNTER — APPOINTMENT (OUTPATIENT)
Dept: ORTHOPEDIC SURGERY | Facility: CLINIC | Age: 65
End: 2020-11-16
Payer: MEDICAID

## 2020-11-16 VITALS
OXYGEN SATURATION: 98 % | DIASTOLIC BLOOD PRESSURE: 80 MMHG | TEMPERATURE: 98.1 F | HEART RATE: 89 BPM | SYSTOLIC BLOOD PRESSURE: 130 MMHG | WEIGHT: 197 LBS | BODY MASS INDEX: 31.66 KG/M2 | HEIGHT: 66 IN

## 2020-11-16 PROCEDURE — 20610 DRAIN/INJ JOINT/BURSA W/O US: CPT | Mod: 50

## 2020-11-16 PROCEDURE — 99072 ADDL SUPL MATRL&STAF TM PHE: CPT

## 2020-11-16 NOTE — PROCEDURE
[de-identified] : Injection: Left knee joint.\par Indication: Osteoarthritis.\par \par A discussion was had with the patient regarding this procedure and all questions were answered. All risks, benefits and alternatives were discussed. These included but were not limited to bleeding, infection, and allergic reaction. Alcohol was used to clean the skin, and betadine was used to sterilize and prep the area in the lateral joint line aspect of the knee. Ethyl chloride spray was then used as a topical anesthetic. A 22-gauge needle was used to inject 1cc of lidocaine 1% without epinephrine into the SubQ followed by  2 cc of HYALGAN #1 into the knee joint with ease. A sterile bandage was then applied. The patient tolerated the procedure well and there were no complications. \par \par Lot #:  [id number]\par Exp:  01/26/2023\par Man: Bibiana\par NDC: [id number]\par \par _____________________________________\par \par Injection: Right knee joint.\par Indication: Osteoarthritis.\par \par A discussion was had with the patient regarding this procedure and all questions were answered. All risks, benefits and alternatives were discussed. These included but were not limited to bleeding, infection, and allergic reaction. Alcohol was used to clean the skin, and betadine was used to sterilize and prep the area in the lateral joint line aspect of the knee. Ethyl chloride spray was then used as a topical anesthetic. A 22-gauge needle was used to inject 1cc of lidocaine 1% without epinephrine into the SubQ followed by  2 cc of HYALGAN #1 into the knee joint with ease. A sterile bandage was then applied. The patient tolerated the procedure well and there were no complications. \par \par Lot #:  [id number]\par Exp:  01/26/2023\par

## 2020-11-23 ENCOUNTER — APPOINTMENT (OUTPATIENT)
Dept: ORTHOPEDIC SURGERY | Facility: CLINIC | Age: 65
End: 2020-11-23
Payer: MEDICAID

## 2020-11-23 VITALS
TEMPERATURE: 98.2 F | HEIGHT: 66 IN | BODY MASS INDEX: 31.66 KG/M2 | SYSTOLIC BLOOD PRESSURE: 130 MMHG | OXYGEN SATURATION: 98 % | WEIGHT: 197 LBS | DIASTOLIC BLOOD PRESSURE: 84 MMHG | HEART RATE: 91 BPM

## 2020-11-23 PROCEDURE — 20610 DRAIN/INJ JOINT/BURSA W/O US: CPT | Mod: 50

## 2020-11-23 NOTE — PROCEDURE
[de-identified] : Injection: Left knee joint.\par Indication: Osteoarthritis.\par \par A discussion was had with the patient regarding this procedure and all questions were answered. All risks, benefits and alternatives were discussed. These included but were not limited to bleeding, infection, and allergic reaction. Alcohol was used to clean the skin, and betadine was used to sterilize and prep the area in the lateral joint line aspect of the knee. Ethyl chloride spray was then used as a topical anesthetic. A 22-gauge needle was used to inject 1cc of lidocaine 1% without epinephrine into the SubQ followed by  2 cc of HYALGAN #2 into the knee joint with ease. A sterile bandage was then applied. The patient tolerated the procedure well and there were no complications. \par \par Lot #:  [id number]\par Exp:  01-\par Man: Fidia\par NDC: [id number]\par \par _____________________________________\par \par Injection: Right knee joint.\par Indication: Osteoarthritis.\par \par A discussion was had with the patient regarding this procedure and all questions were answered. All risks, benefits and alternatives were discussed. These included but were not limited to bleeding, infection, and allergic reaction. Alcohol was used to clean the skin, and betadine was used to sterilize and prep the area in the lateral joint line aspect of the knee. Ethyl chloride spray was then used as a topical anesthetic. A 22-gauge needle was used to inject 1cc of lidocaine 1% without epinephrine into the SubQ followed by  2 cc of HYALGAN #2 into the knee joint with ease. A sterile bandage was then applied. The patient tolerated the procedure well and there were no complications. \par \par Lot #:  [id number]\par Exp:  01-\par Man: Fidia\par NDC: [id number]\par

## 2020-11-30 ENCOUNTER — APPOINTMENT (OUTPATIENT)
Dept: ORTHOPEDIC SURGERY | Facility: CLINIC | Age: 65
End: 2020-11-30
Payer: MEDICAID

## 2020-11-30 VITALS — TEMPERATURE: 97.7 F

## 2020-11-30 DIAGNOSIS — M17.0 BILATERAL PRIMARY OSTEOARTHRITIS OF KNEE: ICD-10-CM

## 2020-11-30 PROCEDURE — 99072 ADDL SUPL MATRL&STAF TM PHE: CPT

## 2020-11-30 PROCEDURE — 20611 DRAIN/INJ JOINT/BURSA W/US: CPT | Mod: LT

## 2020-11-30 RX ORDER — ATORVASTATIN CALCIUM 20 MG/1
20 TABLET, FILM COATED ORAL
Qty: 30 | Refills: 0 | Status: ACTIVE | COMMUNITY
Start: 2020-11-11

## 2020-11-30 RX ORDER — METFORMIN ER 500 MG 500 MG/1
500 TABLET ORAL
Qty: 30 | Refills: 0 | Status: ACTIVE | COMMUNITY
Start: 2020-11-12

## 2020-11-30 RX ORDER — HYDROCHLOROTHIAZIDE 12.5 MG/1
12.5 CAPSULE ORAL
Qty: 30 | Refills: 0 | Status: ACTIVE | COMMUNITY
Start: 2020-11-11

## 2020-11-30 NOTE — PROCEDURE
[de-identified] : Ultrasound-Guided RIGHT Knee Injection\par \par Indication for U/S Guidance: Ensure placement within the tibiofemoral joint for diagnostic purposes, while avoiding neurovascular structures\par \par Indication for Injection: Knee Osteoarthritis\par \par A discussion was had with the patient regarding this procedure and all questions were answered. All risks, benefits and alternatives were discussed. These included but were not limited to bleeding, infection, and allergic reaction. A timeout was done to ensure correct side and pt agreed to the procedure. Betadine was used to sterilize and prep the area, and alcohol was used to clean the skin in the anterior aspect of the knee joint. The suprapatellar space was visualized utilizing the Sonosite, linear transducer. The joint was visualized in the short axis and an in-plane approach was used for the injection. Ultrasound guidance was utilized to ensure accuracy of the intra-articular injection and avoid the neurovascular structures. A 22-gauge 1.5" needle was used to inject 2cc of 1% lidocaine without epi into the SubQ. This was followed by injection with 2cc of HYALGAN #3 into the joint space.  A sterile bandage was then applied. The patient tolerated the procedure well and there were no complications. \par  \par Lot #:  [id number]\par Exp:  2023-01-26\par \par _____________________________________\par \par Ultrasound-Guided LEFT Knee Injection\par \par Indication for U/S Guidance: Ensure placement within the tibiofemoral joint for diagnostic purposes, while avoiding neurovascular structures\par \par Indication for Injection: Knee Osteoarthritis\par \par A discussion was had with the patient regarding this procedure and all questions were answered. All risks, benefits and alternatives were discussed. These included but were not limited to bleeding, infection, and allergic reaction. A timeout was done to ensure correct side and pt agreed to the procedure. Betadine was used to sterilize and prep the area, and alcohol was used to clean the skin in the anterior aspect of the knee joint. The suprapatellar space was visualized utilizing the Sonosite, linear transducer. The joint was visualized in the short axis and an in-plane approach was used for the injection. Ultrasound guidance was utilized to ensure accuracy of the intra-articular injection and avoid the neurovascular structures. A 22-gauge 1.5" needle was used to inject 2cc of 1% lidocaine without epi into the SubQ. This was followed by injection with 2cc of HYALGAN #3 into the joint space.  A sterile bandage was then applied. The patient tolerated the procedure well and there were no complications. \par  \par Lot #:  [id number]\par Exp:  2023-01-26\par

## 2021-01-19 ENCOUNTER — APPOINTMENT (OUTPATIENT)
Dept: PULMONOLOGY | Facility: CLINIC | Age: 66
End: 2021-01-19
Payer: MEDICAID

## 2021-01-19 VITALS
WEIGHT: 193 LBS | HEIGHT: 66 IN | RESPIRATION RATE: 15 BRPM | SYSTOLIC BLOOD PRESSURE: 155 MMHG | HEART RATE: 78 BPM | DIASTOLIC BLOOD PRESSURE: 87 MMHG | BODY MASS INDEX: 31.02 KG/M2 | OXYGEN SATURATION: 98 % | TEMPERATURE: 98.2 F

## 2021-01-19 PROCEDURE — 99214 OFFICE O/P EST MOD 30 MIN: CPT | Mod: 25

## 2021-01-19 PROCEDURE — 94010 BREATHING CAPACITY TEST: CPT

## 2021-01-19 PROCEDURE — 99072 ADDL SUPL MATRL&STAF TM PHE: CPT

## 2021-01-20 NOTE — REASON FOR VISIT
[Follow-Up] : a follow-up visit [TextBox_44] : here doing only fair,  she has missed many of her doses of fasenra.

## 2021-01-20 NOTE — REVIEW OF SYSTEMS
[Fatigue] : fatigue [Cough] : cough [Dyspnea] : dyspnea [Wheezing] : wheezing [Hay Fever] : hay fever [Seasonal Allergies] : seasonal allergies [Negative] : Endocrine

## 2021-01-20 NOTE — PHYSICAL EXAM
[No Acute Distress] : no acute distress [Normal Oropharynx] : normal oropharynx [Normal Appearance] : normal appearance [No Neck Mass] : no neck mass [Normal Rate/Rhythm] : normal rate/rhythm [Normal S1, S2] : normal s1, s2 [No Murmurs] : no murmurs [No Abnormalities] : no abnormalities [Normal Gait] : normal gait [No Clubbing] : no clubbing [No Edema] : no edema [Normal Color/ Pigmentation] : normal color/ pigmentation [No Focal Deficits] : no focal deficits [Oriented x3] : oriented x3 [Normal Affect] : normal affect [TextBox_68] : odd absence of wheeze despite quite poor spirometry

## 2021-01-20 NOTE — DISCUSSION/SUMMARY
[FreeTextEntry1] : need to get her back on fasenra,  needs to start all overr.  she has not gotten a dose since october of 2019.\par \par remains of dual controller\par \par will talk to all involved

## 2021-01-22 LAB — SARS-COV-2 N GENE NPH QL NAA+PROBE: NOT DETECTED

## 2021-04-15 ENCOUNTER — APPOINTMENT (OUTPATIENT)
Dept: ORTHOPEDIC SURGERY | Facility: CLINIC | Age: 66
End: 2021-04-15